# Patient Record
Sex: FEMALE | Race: WHITE | Employment: UNEMPLOYED | URBAN - METROPOLITAN AREA
[De-identification: names, ages, dates, MRNs, and addresses within clinical notes are randomized per-mention and may not be internally consistent; named-entity substitution may affect disease eponyms.]

---

## 2020-02-19 ENCOUNTER — THERAPY VISIT (OUTPATIENT)
Dept: PHYSICAL THERAPY | Facility: CLINIC | Age: 40
End: 2020-02-19
Payer: COMMERCIAL

## 2020-02-19 DIAGNOSIS — M25.572 LEFT ANKLE PAIN: ICD-10-CM

## 2020-02-19 DIAGNOSIS — M25.372 LEFT ANKLE INSTABILITY: ICD-10-CM

## 2020-02-19 PROCEDURE — 97112 NEUROMUSCULAR REEDUCATION: CPT | Mod: GP | Performed by: PHYSICAL THERAPIST

## 2020-02-19 PROCEDURE — 97110 THERAPEUTIC EXERCISES: CPT | Mod: GP | Performed by: PHYSICAL THERAPIST

## 2020-02-19 PROCEDURE — 97161 PT EVAL LOW COMPLEX 20 MIN: CPT | Mod: GP | Performed by: PHYSICAL THERAPIST

## 2020-02-19 NOTE — PROGRESS NOTES
[facility] Initial Evaluation  Subjective:  The history is provided by the patient.   Patient Entered Health History - See Therapist Evaluation below  Kate Jenkins being seen for L ankle sprain/weakness.     Problem began: 2020.   Problem occurred: fell down 2-3 stairs  and reported as 2/10 on pain scale.  General health as reported by patient is good.  Pertinent medical history includes: asthma and overweight.   Red flags:  None as reported by patient.     Surgeries include:  Orthopedic surgery. Other surgery history details: , wisdom teeth.    Current medications:  None.    Current occupation is None.                     Therapist Generated HPI Evaluation  Problem details: Has rolled or sprained her ankle many times over the many years. First one she can remember was about 15 years ago, and feels like her ankles have always been weak.     Planning on running [employer], which will be her first full marathon. Had not ran this year until yesterday. She did a walk/run interval run yesterday, to begin her marathon training. No pain during, but soreness after. .         Type of problem:  Left ankle.    This is a recurrent condition.  Condition occurred with:  A fall/slip.    Patient reports pain:  Lateral and medial.  Pain is described as aching and is constant.  Pain radiates to:  No radiation.   Since onset symptoms are unchanged.  Associated symptoms:  Edema and loss of strength. Symptoms are exacerbated by weight bearing, running, descending stairs, ascending stairs and walking  and relieved by rest (compression and elevation).      Barriers include:  None as reported by patient.    Physical Exam                    Objective:    Gait:  Pronation evident in L, mod degree. Shoewear is [employer] Guide, without insoles. Patient estimates 60 miles in this pair, changes this every 300-400 miles as per recommendation.              Ankle/Foot Evaluation  ROM:  Arom ankle eval:  decreased coordination apparent with in/eversion of L ankle.  AROM:    Dorsiflexion:  Left:   4  Right:   12  Plantarflexion:  Left:  42    Right:  50  Inversion:  Left:  46     Right:  42  Eversion:  10     Right:  15        Strength:    Dorsiflexion:  Left: 5-/5      Pain:+   Right: 5/5   Pain:  Plantarflexion: Left: 5/5    Pain:++   Right: 5/5  Pain:  Inversion:Left: 4/5   Pain:+       Eversion:Left: 4/5   Pain:++  Right: 5/5  Pain:              Strength wnl ankle: pain after 7 SL heel raises.  LIGAMENT TESTING:       Varus Stress (Calc Fib) Left: trace              PALPATION:   Left ankle tenderness present at:  deltoid ligament; anterior talofibular ligament; medial malleolus and lateral malleolus    EDEMA:   Left ankle edema present at: 52  Right ankle edema present at:  50      Figure 8 left: 52Figure 8 right: 50  MOBILITY TESTING:       Talocrural Left: hypomobile            FUNCTIONAL TESTS: Functional test ankle: squats with evident foot prontation.                                                                  General Evaluation:                      Balance:    Single Leg Stance--Eyes Open:  Left: 5/30 sec    Right: 20/30 sec                                                     ROS    Assessment/Plan:    Patient is a 40 year old female with left side ankle complaints.    Patient has the following significant findings with corresponding treatment plan.                Diagnosis 1:  L ankle sprain, recurrent - risk for MOE    Pain -  manual therapy and home program  Decreased ROM/flexibility - manual therapy and therapeutic exercise  Decreased joint mobility - manual therapy and therapeutic exercise  Decreased strength - therapeutic exercise and therapeutic activities  Impaired balance - neuro re-education and therapeutic activities  Decreased proprioception - neuro re-education and therapeutic activities  Decreased function - therapeutic activities    Therapy Evaluation Codes:     Cumulative Therapy  Evaluation is: Low complexity.    Previous and current functional limitations:  (See Goal Flow Sheet for this information)    Short term and Long term goals: (See Goal Flow Sheet for this information)     Communication ability:  Patient appears to be able to clearly communicate and understand verbal and written communication and follow directions correctly.  Treatment Explanation - The following has been discussed with the patient:   RX ordered/plan of care  Anticipated outcomes  Possible risks and side effects  This patient would benefit from PT intervention to resume normal activities.   Rehab potential is good.    Frequency:  1 X week, once daily  Duration:  for 6 weeks  Discharge Plan:  Achieve all LTG.  Independent in home treatment program.  Reach maximal therapeutic benefit.    Please refer to the daily flowsheet for treatment today, total treatment time and time spent performing 1:1 timed codes.

## 2020-02-26 ENCOUNTER — THERAPY VISIT (OUTPATIENT)
Dept: PHYSICAL THERAPY | Facility: CLINIC | Age: 40
End: 2020-02-26
Payer: COMMERCIAL

## 2020-02-26 DIAGNOSIS — M25.572 LEFT ANKLE PAIN: ICD-10-CM

## 2020-02-26 DIAGNOSIS — M25.372 LEFT ANKLE INSTABILITY: ICD-10-CM

## 2020-02-26 PROCEDURE — 97140 MANUAL THERAPY 1/> REGIONS: CPT | Mod: GP | Performed by: PHYSICAL THERAPIST

## 2020-02-26 PROCEDURE — 97110 THERAPEUTIC EXERCISES: CPT | Mod: GP | Performed by: PHYSICAL THERAPIST

## 2020-03-24 PROBLEM — M25.372 LEFT ANKLE INSTABILITY: Status: RESOLVED | Noted: 2020-02-19 | Resolved: 2020-03-24

## 2020-03-24 PROBLEM — M25.572 LEFT ANKLE PAIN: Status: RESOLVED | Noted: 2020-02-19 | Resolved: 2020-03-24

## 2020-03-24 NOTE — PROGRESS NOTES
DISCHARGE SUMMARY    Kate Jenkins was seen 2 times for evaluation and treatment.  Patient did not return for further treatment and current status is unknown.  Due to short treatment duration, no objective or functional changes were made.  Please see goal flow sheet from episode noted date below and initial evaluation for further information.  Patient is discharged from therapy and therapy episode is resolved as of 03/24/20.      Linked Episodes   Type: Episode: Status: Noted: Resolved: Last update: Updated by:   PHYSICAL THERAPY L ankle 2.19.2020 Active 2/19/2020 2/26/2020 12:28 PM Kareem Varela, PT      Comments:

## 2020-11-06 ENCOUNTER — HOSPITAL ENCOUNTER (OUTPATIENT)
Dept: BEHAVIORAL HEALTH | Facility: CLINIC | Age: 40
Discharge: HOME | End: 2020-11-06
Attending: FAMILY MEDICINE | Admitting: FAMILY MEDICINE
Payer: COMMERCIAL

## 2020-11-06 ENCOUNTER — BEH TREATMENT PLAN (OUTPATIENT)
Dept: BEHAVIORAL HEALTH | Facility: CLINIC | Age: 40
End: 2020-11-06
Attending: PSYCHIATRY & NEUROLOGY

## 2020-11-06 DIAGNOSIS — F33.1 MAJOR DEPRESSIVE DISORDER, RECURRENT EPISODE, MODERATE WITH ANXIOUS DISTRESS (H): ICD-10-CM

## 2020-11-06 PROCEDURE — 90791 PSYCH DIAGNOSTIC EVALUATION: CPT | Mod: GT | Performed by: SOCIAL WORKER
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
## 2020-11-09 ENCOUNTER — TELEPHONE (OUTPATIENT)
Dept: BEHAVIORAL HEALTH | Facility: CLINIC | Age: 40
End: 2020-11-09

## 2020-11-09 ASSESSMENT — ANXIETY QUESTIONNAIRES
5. BEING SO RESTLESS THAT IT IS HARD TO SIT STILL: NOT AT ALL
3. WORRYING TOO MUCH ABOUT DIFFERENT THINGS: SEVERAL DAYS
7. FEELING AFRAID AS IF SOMETHING AWFUL MIGHT HAPPEN: MORE THAN HALF THE DAYS
GAD7 TOTAL SCORE: 11
IF YOU CHECKED OFF ANY PROBLEMS ON THIS QUESTIONNAIRE, HOW DIFFICULT HAVE THESE PROBLEMS MADE IT FOR YOU TO DO YOUR WORK, TAKE CARE OF THINGS AT HOME, OR GET ALONG WITH OTHER PEOPLE: SOMEWHAT DIFFICULT
1. FEELING NERVOUS, ANXIOUS, OR ON EDGE: MORE THAN HALF THE DAYS
6. BECOMING EASILY ANNOYED OR IRRITABLE: NEARLY EVERY DAY
2. NOT BEING ABLE TO STOP OR CONTROL WORRYING: MORE THAN HALF THE DAYS

## 2020-11-09 ASSESSMENT — PATIENT HEALTH QUESTIONNAIRE - PHQ9
SUM OF ALL RESPONSES TO PHQ QUESTIONS 1-9: 8
5. POOR APPETITE OR OVEREATING: SEVERAL DAYS

## 2020-11-09 NOTE — TELEPHONE ENCOUNTER
Admission Date: 11/9/2020    Identify any current concerns with potential impact to admission:     medication/medical concerns: On Zoloft for 8 weeks at 50 mg.  NP  [facility] in [place], [place].  Want to look for a new provider.     immediate safety concerns:No    Does patient have safety plan? Yes.   Note: Please copy safety plan copied into [facility] Encounter     Other (insurance/childcare/transportation/housing/planned absences/etc): None    Patient's insurance is: [employer] [place] of [place] . Does patient need appointment with provider? Need to check    E-DEQUAN okay.                                                                                          Completed by: Hannah Kelly, MSW, LICSW

## 2020-11-10 ENCOUNTER — HOSPITAL ENCOUNTER (OUTPATIENT)
Dept: BEHAVIORAL HEALTH | Facility: CLINIC | Age: 40
End: 2020-11-10
Attending: PSYCHIATRY & NEUROLOGY
Payer: COMMERCIAL

## 2020-11-10 PROBLEM — F33.1 MAJOR DEPRESSIVE DISORDER, RECURRENT EPISODE, MODERATE WITH ANXIOUS DISTRESS (H): Status: ACTIVE | Noted: 2020-11-10

## 2020-11-10 PROCEDURE — 90853 GROUP PSYCHOTHERAPY: CPT | Mod: GT | Performed by: SOCIAL WORKER

## 2020-11-10 PROCEDURE — 90853 GROUP PSYCHOTHERAPY: CPT | Mod: 95 | Performed by: SOCIAL WORKER

## 2020-11-10 PROCEDURE — G0177 OPPS/PHP; TRAIN & EDUC SERV: HCPCS | Mod: GT

## 2020-11-10 ASSESSMENT — ANXIETY QUESTIONNAIRES: GAD7 TOTAL SCORE: 11

## 2020-11-10 NOTE — PROGRESS NOTES
Adult Day Treatment Program:  Individualized Treatment Plan     Date of Plan: 20    Name: Kate Jenikns MRN: [id number]    : 1980    Programs:  Adult Day Treatment (ADT)     Clinical Track (if applicable):  5A    DSM5 Diagnosis  296.32 (F33.1) Major Depressive Disorder, Recurrent Episode, Moderate _ and With anxious distress  300.02 (F41.1) Generalized Anxiety Disorder.    Adult Day Treatment Program Multidisciplinary Team Members: Keo Middleton MD and/or Dr. Emilia Valle, and/or Dr. Naveen Patel MD;     Kate Jenkins will participate in the Adult Day Treatment Program  3 days per week, 3 hours per day. Anticipated duration/discharge: 12 weeks    Due to COVID-19, services will be delivered via telemedicine until further notice.     Program Start Date: 11/10/20  Anticipated Discharge Date: 21 (pending authorization/clinical changes)    NOTE: Complete CGI     Review Date: Does Kate Jenkins continue to meet criteria to participate in the Adult Day Treatment Program, 3 days per week; 3 hours per day?   21                        Client Strengths:  caring, creative, educated, empathetic, has a previous history of therapy, intelligent, motivated, open to learning, open to suggestions / feedback, responsible parent, support of family, friends and providers, supportive and wants to learn     Client Participation in Plan:  Contributed to goals and plan     Areas of Vulnerability:  Anxiety  Depressive symptoms     Long-Term Goals:  Knowledge about illness and management of symptoms     Abuse Prevention Plan:  Safe, therapeutic environment   Safety coping plan as needed   Education regarding illness and skill development   Coordination with care providers     Discharge Criteria:  Satisfactory progress toward treatment goals   Improvement re: identified problems and symptoms   Ability to continue recovery at next level of service   Has a discharge plan in place   Has safety/coping plan in  place      Areas of Treatment Focus        Area of Treatment Focus:   Personal Safety  Start Date:    11/17/21    Goal:  Target Date: 1/12/21 Status: Subhash Love will notify staff when needing assistance to develop or implement a coping plan to manage suicidal or self injurious urges.Use coping plan for safety, as needed.      Progress:   12/22/20:   Client requested to discharge.   She denied suicidal ideation.        Treatment Strategies:   Assess / reassess level of potential for harm to self or others  Engage in safety planning when indicated  Facilitate increased self awareness          Area of Treatment Focus:   Symptom Stabilization and Management  Start Date:    11/17/20    Goal:  Target Date: 1/12/21 Status: Active  When in life skills Kate  will provide an update related to perceived progress with mental health recovery weekly.      Progress:   12/22/20:  Client made progress and met the goal.        Treatment Strategies:   Facilitate increased self awareness  Provide education regarding startegies and coping skills to assist with mental health recovery          Area of Treatment Focus:   Wellness and Mental Health  Start Date:    11/17/20    Goal:  Target Date: 1/12/21 Status: Subhash Love will improve wellness related behaviors by getting enough sleep,exercise, balanced nutrition and take medications (if prescribed) to maintain good mental health.        Progress:   12/22/20: Client worked on establishing a stable sleep routine.  She worked on balance as a parent.          Treatment Strategies:   Facilitate increased self awareness  Provide education regarding wellness habits and routines which can assist with mental health recovery          Area of Treatment Focus:   Community Resources / Support and Discharge Planning  Start Date:    11/17/20    Goal:  Target Date: 1/12/21 Status: Subhash Love will establish an aftercare plan to include medical providers and social supports by  "discharge.  Medications through Jo Uribe at [facility] in [place], [place].  Individual therapy with  Katie Richard [place], [place].  Also doing couples counseling.      Progress:   12/22/20:  Client is continuing with therapy and medication management.  She plans to attend a 6 week educational group on [facility] based on Crystal Dumont's work.          Treatment Strategies:   Facilitate increased self awareness  Provide education regarding relapse prevention,discharge planning and mental health support          Area of Treatment Focus:   Symptom Stabilization and Management  Start Date:    11/17/21    Goal:  Target Date: 1/12/21 Status: Completed  Kate will learn and practice tools for managing anxiety,  Kate will work on improving communication  with loved ones.      Progress:   12/22/20:  Kate consistently learned and practiced skills to manage anxiety.  She also practiced communication skills.        Treatment Strategies:   Teach adaptive coping skills and communication skills       Kameron Portillo, OTR/L      NOTE: Required signatures are completed manually and scanned into the electronic medical record. See \"Media\" tab in epic.    The Individualized Treatment Plan Signature Page has been routed to the provider for co-sign.      "

## 2020-11-10 NOTE — DISCHARGE SUMMARY
Adult Mental Health Intensive Outpatient Discharge Summary/Instructions      Patient: Kate Jenkins MRN: [id number]   : 1980 Age: 40 year old Sex: female     Admission Date: 11/10/20  Discharge Date: 20  Diagnosis: 296.32 (F33.1) Major Depressive Disorder, Recurrent Episode, Moderate _ and With anxious distress  300.02 (F41.1) Generalized Anxiety Disorder    Focus of Treatment / Progress    Personal Safety: Kate denied suicidal ideation at discharge.     * Follow your safety plan     * Call crisis lines as needed:    [facility] [phone] [facility] [phone]  [facility] [phone] Crisis Connection [phone]  [facility] [phone] [facility] [phone]  [facility] [phone] National Suicide Prevention [phone]  [facility] [phone] Suicide Prevention [phone]  [facility] [phone]    Managing symptoms of:  Kate worked on skills to manage depressive and anxiety symptoms.   She also worked on skills to challenge negative self talk.  She worked on skills for assertive communication.    Community support/[place] [address] ([phone]) [email or web address] [place] Crisis text line( Text [place] to 040663),  [facility]  [place], [place] ([phone])  [facility] [place], [place] ( [phone])  [facility] [address] [place] [address], [phone]      Managing Symptoms and Preventing Relapse    * Go to all of your appointments    * Take all medications as directed.      * Carry a current list if medication with you    * Do not use illicit (street) drugs.  Avoid alcohol    * Report these symptoms to your care team. These are early signs of relapse:   Thoughts of suicide   Losing more sleep   Increased confusion   Mood getting worse   Feeling more aggressive   Other:  Low motivation, anhedonia    *Use these skills daily:  Talk to someone you  "trust at least one time weekly, set boundaries and say \"no\", be assertive, act opposite of negative feelings, accept challenges with a positive attitude, exercise at least three times per week for 30 minutes,  get enough sleep, eat healthy foods, get into a good routine    Copy of summary sent to: In Epic    Follow up with psychiatrist / main caregiver: Jo Uribe NP, primary care provider    Next visit: as scheduled    Follow up with your therapist: Verenice Richard    Next visit: 1/5/21.  Usually weekly or every other week visits    Go to group therapy and / or support groups at: Consider [facility] and Depression Bipolar Support [place](DBSA) support groups    See your medical doctor about:  For an annual physical exam or any general wellness or illness as needed.    Other:  Kate plans on attending a 6 week group working through [facility] based on Crystal Marinelli's work.  Kate and her  also attend marriage therapy monthly.      Your treatment team appreciates having the opportunity to work with you and wishes you the best.    Client Signature:_______________________  Date / Time:___________  Staff Signature:________________________   Date / Time:___________      "

## 2020-11-10 NOTE — GROUP NOTE
Psychotherapy Group Note    PATIENT'S NAME: Kate Jenkins  MRN:   [id number]  :   1980  ACCT. NUMBER: [id number]  DATE OF SERVICE: 11/10/20  START TIME: 11:00 AM  END TIME: 11:50 AM  FACILITATOR: Hannah Kelly LICSW  TOPIC:  EBP Group: Emotions Management  Adult Mental Health Day Treatment  TRACK: 5A    NUMBER OF PARTICIPANTS: 6    Summary of Group / Topics Discussed:  Emotions Management: Guilt and Shame: Patients explored and shared personal experiences associated with feelings of guilt and shame.  Group explored how these feelings develop, what they mean to each individual, and how to increase acceptance and usefulness of these feelings.  Group members assisted one another to contextualize these concepts and promote healing.     Patient Session Goals / Objectives:    Discuss and review definitions and personal views/experiences with guilt and shame    Understand the differences between guilt and shame    Explore how feelings of guilt and shame impact functioning    Understand and practice strategies to manage difficult emotions and move towards healing    Understand and normalize difficult emotions through group discussion    Understand the utility of guilt and shame    Target  unwanted  emotions for change    Telemedicine Visit: The patient's condition can be safely assessed and treated via synchronous audio and visual telemedicine encounter.      Reason for Telemedicine Visit: Services only offered telehealth    Originating Site (Patient Location): Patient's home    Distant Site (Provider Location): Provider Remote Setting    Consent:  The patient/guardian has verbally consented to: the potential risks and benefits of telemedicine (video visit) versus in person care; bill my insurance or make self-payment for services provided; and responsibility for payment of non-covered services.     Mode of Communication:  Video Conference via Grokker    As the provider I attest to compliance with  applicable laws and regulations related to telemedicine.       Patient Participation / Response:  Fully participated with the group by sharing personal reflections / insights and openly received / provided feedback with other participants.    Demonstrated knowledge of when to consider applying a variety of emotions management skills in daily life    Treatment Plan:  Patient has an initial individualized treatment plan that was created as part of their diagnostic assessment / admission process.  A master individualized treatment plan is in the process of being developed with the patient and multi-disciplinary care team.    Hannah Kelly, [facility]

## 2020-11-10 NOTE — GROUP NOTE
Process Group Note    PATIENT'S NAME: Kate Jenkins  MRN:   [id number]  :   1980  ACCT. NUMBER: [id number]  DATE OF SERVICE: 11/10/20  START TIME:  9:00 AM  END TIME:  9:50 AM  FACILITATOR: Hannah Kelly LICSW  TOPIC: MH Process Group    Diagnoses:  296.32 (F33.1) Major Depressive Disorder, Recurrent Episode, Moderate _ and With anxious distress  300.02 (F41.1) Generalized Anxiety Disorder.      Adult Mental Health Day Treatment  TRACK: 5A    NUMBER OF PARTICIPANTS: 6    Telemedicine Visit: The patient's condition can be safely assessed and treated via synchronous audio and visual telemedicine encounter.      Reason for Telemedicine Visit: Services only offered telehealth    Originating Site (Patient Location): Patient's home    Distant Site (Provider Location): Provider Remote Setting    Consent:  The patient/guardian has verbally consented to: the potential risks and benefits of telemedicine (video visit) versus in person care; bill my insurance or make self-payment for services provided; and responsibility for payment of non-covered services.     Mode of Communication:  Video Conference via Dailymotion    As the provider I attest to compliance with applicable laws and regulations related to telemedicine.           Data:    Session content: At the start of this group, patients were invited to check in by identifying themselves, describing their current emotional status, and identifying issues to address in this group.   Area(s) of treatment focus addressed in this session included Symptom Management, Personal Safety and Community Resources/Discharge Planning.  Writer welcomed and oriented client to groups.  Writer reviewed confidentiality, limitations of confidentiality, and group guidelines.  Clari reported dealing with anxiety and depression for about 20 years.   She stated for past one year her symptoms have been high.  [place] in [place] and loved her job, family, and  [facility].   She stated that she got  then  again.   She stated that the couple decided to move to the [facility] to be close to her 's family and this has been difficult.   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 She stated that she would like to work on skills to understand her symptoms.   She also worked like to work on effective communication and assertiveness skills.  Client denied suicidal ideation, intent and plan.     Therapeutic Interventions/Treatment Strategies:  Psychotherapist offered support, feedback and validation and reinforced use of skills. Treatment modalities used include Cognitive Behavioral Therapy. Interventions include Coping Skills: Assisted patient in identifying 1-2 healthy distraction skills to reduce overall distress.    Assessment:    Patient response:   Patient responded to session by listening and focusing on goals    Possible barriers to participation / learning include: and no barriers identified    Health Issues:   None reported       Substance Use Review:   Substance Use: No active concerns identified.    Mental Status/Behavioral Observations  Appearance:   Appropriate   Eye Contact:   Good   Psychomotor Behavior: Normal   Attitude:   Cooperative   Orientation:   All  Speech   Rate / Production: Normal    Volume:  Normal   Mood:    Anxious  Depressed   Affect:    Appropriate   Thought Content:   Clear  Thought Form:  Coherent  Logical     Insight:    Good     Plan:     Safety Plan: No current safety concerns identified.  Recommended that patient call 911 or go to the local ED should there be a change in any of these risk factors.     Barriers to treatment: None identified    Patient Contracts (see media tab):  None    Substance Use: Not addressed in session     Continue or Discharge: Patient will continue in Adult  Day Treatment (ADT)  as planned. Patient is likely to benefit from learning and using skills as they work toward the goals identified in their treatment plan.      Hannha Kelly, [facility]  November 10, 2020

## 2020-11-10 NOTE — GROUP NOTE
Psychoeducation Group Note    PATIENT'S NAME: Kate Jenkins  MRN:   [id number]  :   1980  ACCT. NUMBER: [id number]  DATE OF SERVICE: 11/10/20  START TIME: 10:00 AM  END TIME: 10:50 AM  FACILITATOR: Kameron Portillo OTR/L  TOPIC: MH Life Skills Group: Resiliency Development  Telemedicine Visit: The patient's condition can be safely assessed and treated via synchronous audio and visual telemedicine encounter.      Reason for Telemedicine Visit: COVID-19    Originating Site (Patient Location): Patient's home    Distant Site (Provider Location): [facility]: [facility]    Consent:  The patient/guardian has verbally consented to: the potential risks and benefits of telemedicine (video visit) versus in person care; bill my insurance or make self-payment for services provided; and responsibility for payment of non-covered services.     Mode of Communication:  Video Conference via Wayin    As the provider I attest to compliance with applicable laws and regulations related to telemedicine.   Adult Mental Health Day Treatment  TRACK: 5A    NUMBER OF PARTICIPANTS: 6    Summary of Group / Topics Discussed:  Resiliency Development:  Self Esteem and Self Compassion: A Letter to Myself: Patients were given time for reflection and built self awareness around components of self compassion and how it relates to self esteem and overall functioning.  Patients were also given the opportunity to improve self efficacy, self sufficiency, quality of life and a sense of mastery and competency by identifying what is good and to instill hope. Patients were taught about the importance of self kindness and ways to challenge negative thinking.      Patient Session Goals / Objectives:    Identified personal strengths and qualities about themselves as a way to provide hope and build self-compassion as a way to effectively manage both mental health and substance abuse/abuse symptoms     Improved awareness of  positive qualities and how these contribute to the process of recovery        Established a plan for practice of these skills in their own environments    Practiced and reflected on how to generalize taught skills to their everyday life        Patient Participation / Response:  Fully participated with the group by sharing personal reflections / insights and openly received / provided feedback with other participants.    Demonstrated understanding of content through handout and group discussion,  and Verbalized understanding of content    Treatment Plan:  Patient has a current master individualized treatment plan.  See Epic treatment plan for more information.    Kameron Portillo, OTR/L

## 2020-11-12 ENCOUNTER — HOSPITAL ENCOUNTER (OUTPATIENT)
Dept: BEHAVIORAL HEALTH | Facility: CLINIC | Age: 40
End: 2020-11-12
Attending: PSYCHIATRY & NEUROLOGY
Payer: COMMERCIAL

## 2020-11-12 PROCEDURE — 90853 GROUP PSYCHOTHERAPY: CPT | Mod: 95 | Performed by: SOCIAL WORKER

## 2020-11-12 PROCEDURE — 90853 GROUP PSYCHOTHERAPY: CPT | Mod: GT | Performed by: SOCIAL WORKER

## 2020-11-12 NOTE — GROUP NOTE
Psychotherapy Group Note    PATIENT'S NAME: Kate Jenkins  MRN:   [id number]  :   1980  ACCT. NUMBER: [id number]  DATE OF SERVICE: 20  START TIME: 10:00 AM  END TIME: 10:50 AM  FACILITATOR: Hannah Kelly LICSW  TOPIC: MH EBP Group: Specialty Awareness  Adult Mental Health Day Treatment  TRACK: 5A    NUMBER OF PARTICIPANTS: 7    Summary of Group / Topics Discussed:  Specialty Topics: Life Transitions: The topic of life transitions was presented in order to help patients to better understand the challenges presented by life transitions, and how to best navigate them. Exploring the phases of transition and how one works through them was discussed. Patients were provided with information regarding community resources.     Patient Session Goals / Objectives:    Discussed the timing and nature of major life transitions    Explored how life transitions may impact mental health and functioning    Discussed coping strategies to manage symptoms and help with transitioning    Discussed and planned a successful transition  Telemedicine Visit: The patient's condition can be safely assessed and treated via synchronous audio and visual telemedicine encounter.      Reason for Telemedicine Visit: Services only offered telehealth    Originating Site (Patient Location): Patient's home    Distant Site (Provider Location): Provider Remote Setting    Consent:  The patient/guardian has verbally consented to: the potential risks and benefits of telemedicine (video visit) versus in person care; bill my insurance or make self-payment for services provided; and responsibility for payment of non-covered services.     Mode of Communication:  Video Conference via Chictini    As the provider I attest to compliance with applicable laws and regulations related to telemedicine.       Patient Participation / Response:  Fully participated with the group by sharing personal reflections / insights and openly received /  provided feedback with other participants.    Identified / Expressed readiness to act on skill suggestions discussed in topic    Treatment Plan:  Patient has a current master individualized treatment plan.  See Epic treatment plan for more information.    Hannah Kelly, SIDRASW

## 2020-11-12 NOTE — GROUP NOTE
"Process Group Note    PATIENT'S NAME: Kate Jenkins  MRN:   [id number]  :   1980  ACCT. NUMBER: [id number]  DATE OF SERVICE: 20  START TIME:  9:00 AM  END TIME:  9:50 AM  FACILITATOR: Hannah Kelly [facility]  TOPIC: MH Process Group    Diagnoses:  296.32 (F33.1) Major Depressive Disorder, Recurrent Episode, Moderate _ and With anxious distress  300.02 (F41.1) Generalized Anxiety Disorder.      Adult Mental Health Day Treatment  TRACK: 5A    NUMBER OF PARTICIPANTS: 7    Telemedicine Visit: The patient's condition can be safely assessed and treated via synchronous audio and visual telemedicine encounter.      Reason for Telemedicine Visit: Services only offered telehealth    Originating Site (Patient Location): Patient's home    Distant Site (Provider Location): Provider Remote Setting    Consent:  The patient/guardian has verbally consented to: the potential risks and benefits of telemedicine (video visit) versus in person care; bill my insurance or make self-payment for services provided; and responsibility for payment of non-covered services.     Mode of Communication:  Video Conference via MusicXray    As the provider I attest to compliance with applicable laws and regulations related to telemedicine.           Data:    Session content: At the start of this group, patients were invited to check in by identifying themselves, describing their current emotional status, and identifying issues to address in this group.   Area(s) of treatment focus addressed in this session included Symptom Management, Personal Safety and Community Resources/Discharge Planning.  Kate reported that she was really tired after her first day on Tuesday.   She stated that she felt more productive and energetic on Wednesday.  She stated that she wanted feedback for managing the self-talk \"I am not good enough\" and for ways to handle it when her  offers criticism.  Client denied suicidal ideation, intent and " plan.     Therapeutic Interventions/Treatment Strategies:  Psychotherapist offered support, feedback and validation and reinforced use of skills. Treatment modalities used include Cognitive Behavioral Therapy. Interventions include Coping Skills: Assisted patient in identifying 1-2 healthy distraction skills to reduce overall distress.    Assessment:    Patient response:   Patient responded to session by giving feedback, listening and being attentive    Possible barriers to participation / learning include: and no barriers identified    Health Issues:   None reported       Substance Use Review:   Substance Use: No active concerns identified.    Mental Status/Behavioral Observations  Appearance:   Appropriate   Eye Contact:   Good   Psychomotor Behavior: Normal   Attitude:   Cooperative   Orientation:   All  Speech   Rate / Production: Normal    Volume:  Normal   Mood:    Anxious  Depressed   Affect:    Appropriate   Thought Content:   Clear  Thought Form:  Coherent  Logical     Insight:    Good     Plan:     Safety Plan: No current safety concerns identified.  Recommended that patient call 911 or go to the local ED should there be a change in any of these risk factors.     Barriers to treatment: None identified    Patient Contracts (see media tab):  None    Substance Use: Not addressed in session     Continue or Discharge: Patient will continue in Adult Day Treatment (ADT)  as planned. Patient is likely to benefit from learning and using skills as they work toward the goals identified in their treatment plan.      Hannah Kelly, [facility]  November 12, 2020

## 2020-11-12 NOTE — GROUP NOTE
Psychotherapy Group Note    PATIENT'S NAME: Kate Jenkins  MRN:   [id number]  :   1980  ACCT. NUMBER: [id number]  DATE OF SERVICE: 20  START TIME: 11:00 AM  END TIME: 11:50 AM  FACILITATOR: Hannah Dodge LICSW  TOPIC:  EBP Group: Enhanced Mindfulness  Adult Mental Health Day Treatment  TRACK: 5A    NUMBER OF PARTICIPANTS: 6    Summary of Group / Topics Discussed:  Enhanced Mindfulness: Body and Mind Integration: Patients received an overview and education regarding the importance of including the body in the management of emotional health and self-care and as a direct route to mindfulness practice.  Patients discussed various ways in which the body can serve as an informant to their physical and emotional experiences/need. Patients discussed the body as a direct link to the present moment and to mindfulness practice.  Patients discussed current relationship with body, self-awareness, mindfulness practice and barriers to connection with body.  Patients were guided through breath work and movement to facilitate greater self-awareness, grounding, self-expression, and connection to other.  Patients discussed how the experiential could be applied to better manage mental health and develop noyola connection to self.    Patient Session Goals / Objectives:    Identify how movement awareness could be used for grounding, stress management, self-expression, connection to other and self-regulation    Improved awareness of breath and movement preferences    Identify how movement and the body is used in mindfulness practice    Reflect on use of these practices in everyday life.    Identify barriers to attending to body    Telemedicine Visit: The patient's condition can be safely assessed and treated via synchronous audio and visual telemedicine encounter.          Reason for Telemedicine Visit: Services only offered telehealth and [id number]        Originating Site (Patient Location): Patient's home        Distant  Site (Provider Location): Provider Remote Setting        Consent:  The patient/guardian has verbally consented to: the potential risks and benefits of telemedicine (video visit) versus in person care; bill my insurance or make self-payment for services provided; and responsibility for payment of non-covered services.         Mode of Communication:  Video Conference via [email or web address]        As the provider I attest to compliance with applicable laws and regulations related to telemedicine.         Patient Participation / Response:  Fully participated with the group by sharing personal reflections / insights and openly received / provided feedback with other participants.    Demonstrated understanding of topics discussed through group discussion and participation, Identified / Expressed readiness to act on skill suggestions discussed in topic and Practiced skills in session    Treatment Plan:  Patient has a current master individualized treatment plan.  See Epic treatment plan for more information.    SIDRA PaulaSW

## 2020-11-13 ENCOUNTER — TELEPHONE (OUTPATIENT)
Dept: BEHAVIORAL HEALTH | Facility: CLINIC | Age: 40
End: 2020-11-13

## 2020-11-13 NOTE — TELEPHONE ENCOUNTER
"RN Review of Medical History / Physical Health Screen  Outpatient Mental Health Programs - Adult    Adult Mental Health Day Treatment    PATIENT'S NAME: Kate Jenkins  MRN:   [id number]  :   1980  ACCT. NUMBER: [id number]  CURRENT AGE:  40 year old    DATE OF DIAGNOSTIC ASSESSMENT: 20  DATE OF ADMISSION: 11/10/20     Please see Diagnostic Assessment for additional Medical History.     General Health:   Have you had any exposure to any communicable disease in the past 2-3 weeks? no     Are you aware of safe sex practices? yes       Nutrition:    Are you on a special diet? If yes, please explain:  no   Do you have any concerns regarding your nutritional status? If yes, please explain:  no   Have you had any appetite changes in the last 3 months?  No     Have you had any weight loss or weight gain in the last 3 months?  Yes, how much? Lost 5-10 pounds; trying to do so     Do you have a history of an eating disorder? no   Do you have a history of being in an eating disorder program? no     Patient height and weight recorded by RN in epic flowsheet: no No; Unable to measure  Because of temporary in-person programmatic suspension due to COVID-19 pandemic, all pt weights and heights will be collected through patient self-report an recorded in physical health screening progress note upon admission to the program.                            Height/Weight Review:  Patient reported height:     5'3\"   Patient reports weight:  Date last checked:  175 pounds; today   Any referrals/needs identified?  none identified              BMI Review:  Was the patient informed of BMI? no see above      Findings See above       Fall Risk:   Have you had any falls in the past 3 months? yes fell about 4 months ago - twisted ankle while running     Do you currently use any assistive devices for mobility?   no      Additional Comments/Assessment: Reports occasional dizziness when standing up quickly, has been happening occassionally " for many years. Denies seizures.     Per completion of the Medical History / Physical Health Screen, is there a recommendation to see / follow up with a primary care physician/clinic or dentist?    No.      Miriam Ricks RN  11/13/2020    \

## 2020-11-16 ENCOUNTER — HOSPITAL ENCOUNTER (OUTPATIENT)
Dept: BEHAVIORAL HEALTH | Facility: CLINIC | Age: 40
End: 2020-11-16
Attending: PSYCHIATRY & NEUROLOGY
Payer: COMMERCIAL

## 2020-11-16 PROCEDURE — 90853 GROUP PSYCHOTHERAPY: CPT | Mod: 95 | Performed by: SOCIAL WORKER

## 2020-11-16 PROCEDURE — G0177 OPPS/PHP; TRAIN & EDUC SERV: HCPCS | Mod: GT

## 2020-11-16 NOTE — GROUP NOTE
Psychotherapy Group Note    PATIENT'S NAME: Kate Jenkins  MRN:   [id number]  :   1980  ACCT. NUMBER: [id number]  DATE OF SERVICE: 20  START TIME: 10:00 AM  END TIME: 10:50 AM  FACILITATOR: Hannah Kelly LICSW  TOPIC: MH EBP Group: Coping Skills  [facility] Mental Health Day Treatment  TRACK: 5A    NUMBER OF PARTICIPANTS: 5    Summary of Group / Topics Discussed:  Coping Skills: Radical Acceptance: Patients learned radical acceptance as a way to tolerate heightened stress in the moment.  Patients identified situations that necessitate radical acceptance.  They focused on applying acceptance of the moment to tolerate difficult emotions and events. Patients discussed how to distinguish when this can be useful in their lives and when other skills are more relevant or helpful.    Patient Session Goals / Objectives:    Understand that some amount of pain exists for each of us in our lives    Process the difficulty of acceptance in our lives and benefits of radical acceptance to mood and functioning.    Demonstrate understanding of when to use the radical acceptance to tolerate distress by providing examples of situations where this could be applied.    Identify barriers to applying radical acceptance to difficult situations and explore strategies to overcome them    Telemedicine Visit: The patient's condition can be safely assessed and treated via synchronous audio and visual telemedicine encounter.      Reason for Telemedicine Visit: Services only offered telehealth    Originating Site (Patient Location): Patient's home    Distant Site (Provider Location): Provider Remote Setting    Consent:  The patient/guardian has verbally consented to: the potential risks and benefits of telemedicine (video visit) versus in person care; bill my insurance or make self-payment for services provided; and responsibility for payment of non-covered services.     Mode of Communication:  Video Conference  via [employer]    As the provider I attest to compliance with applicable laws and regulations related to telemedicine.         Patient Participation / Response:  Fully participated with the group by sharing personal reflections / insights and openly received / provided feedback with other participants.    Identified 2-3 positive coping strategies that have helped maintain / improve symptoms in the past    Treatment Plan:  Patient has a current master individualized treatment plan.  See Epic treatment plan for more information.    Hannah Kelly, SIDRASW

## 2020-11-16 NOTE — GROUP NOTE
Process Group Note    PATIENT'S NAME: Kate Jenkins  MRN:   [id number]  :   1980  ACCT. NUMBER: [id number]  DATE OF SERVICE: 20  START TIME:  9:00 AM  END TIME:  9:50 AM  FACILITATOR: Hannah Kelly LICSW  TOPIC: MH Process Group    Diagnoses:  296.32 (F33.1) Major Depressive Disorder, Recurrent Episode, Moderate _ and With anxious distress  300.02 (F41.1) Generalized Anxiety Disorder.      [facility] Mental Health Day Treatment  TRACK: 5A    NUMBER OF PARTICIPANTS: 5    Telemedicine Visit: The patient's condition can be safely assessed and treated via synchronous audio and visual telemedicine encounter.      Reason for Telemedicine Visit: Services only offered telehealth    Originating Site (Patient Location): Patient's home    Distant Site (Provider Location): Provider Remote Setting    Consent:  The patient/guardian has verbally consented to: the potential risks and benefits of telemedicine (video visit) versus in person care; bill my insurance or make self-payment for services provided; and responsibility for payment of non-covered services.     Mode of Communication:  Video Conference via [facility]    As the provider I attest to compliance with applicable laws and regulations related to telemedicine.           Data:    Session content: At the start of this group, patients were invited to check in by identifying themselves, describing their current emotional status, and identifying issues to address in this group.   Area(s) of treatment focus addressed in this session included Symptom Management, Personal Safety and Community Resources/Discharge Planning.  Kate reported feeling overwhelmed today.   She shared that she has Day Treatment, individual therapy, and a medication management appointment all today.  She reported feeling anxious about the medication management appointment as she has been having restless legs.   She is concerned what will happen if she increases the  sertraline and if she will need to discontinue the medication.  Peers gave feedback on ways to share concerns with the provider.  Client denied suicidal ideation, intent and plan.     Therapeutic Interventions/Treatment Strategies:  Psychotherapist offered support, feedback and validation and reinforced use of skills. Treatment modalities used include Cognitive Behavioral Therapy. Interventions include Relationship Skills: Assisted patients in implementing more effective communication skills in their relationships.    Assessment:    Patient response:   Patient responded to session by listening, focusing on goals and being attentive    Possible barriers to participation / learning include: and no barriers identified    Health Issues:   None reported       Substance Use Review:   Substance Use: No active concerns identified.    Mental Status/Behavioral Observations  Appearance:   Appropriate   Eye Contact:   Good   Psychomotor Behavior: Normal   Attitude:   Cooperative   Orientation:   All  Speech   Rate / Production: Normal    Volume:  Normal   Mood:    Anxious  Depressed   Affect:    Appropriate   Thought Content:   Clear  Thought Form:  Coherent  Logical     Insight:    Good     Plan:     Safety Plan: No current safety concerns identified.  Recommended that patient call 911 or go to the local ED should there be a change in any of these risk factors.     Barriers to treatment: None identified    Patient Contracts (see media tab):  None    Substance Use: Not addressed in session     Continue or Discharge: Patient will continue in Adult Day Treatment (ADT)  as planned. Patient is likely to benefit from learning and using skills as they work toward the goals identified in their treatment plan.      Hannah Kelly, [facility]  November 16, 2020

## 2020-11-16 NOTE — GROUP NOTE
Psychoeducation Group Note    PATIENT'S NAME: Kate Jenkins  MRN:   [id number]  :   1980  ACCT. NUMBER: [id number]  DATE OF SERVICE: 20  START TIME: 11:00 AM  END TIME: 11:50 AM  FACILITATOR: Miriam Ricks RN  TOPIC:  RN Group: [facility]  Telemedicine Visit: The patient's condition can be safely assessed and treated via synchronous audio and visual telemedicine encounter.      Reason for Telemedicine Visit:  covid19    Originating Site (Patient Location): Patient's home    Distant Site (Provider Location): Provider Remote Setting    Consent:  The patient/guardian has verbally consented to: the potential risks and benefits of telemedicine (video visit) versus in person care; bill my insurance or make self-payment for services provided; and responsibility for payment of non-covered services.     Mode of Communication:  Video Conference via [employer]    As the provider I attest to compliance with applicable laws and regulations related to telemedicine.      [facility] Mental Health Day Treatment  TRACK: 5A    NUMBER OF PARTICIPANTS: 5    Summary of Group / Topics Discussed:  Foundations of Health: Nutrition: Functional nutrition: Patients were provided education on the role of nutrition in the body and all of the functions that nutrition influences including energy, body structure and bodily function as overarching categories. Select macronutrients and micronutrients and their important roles and functions were also reviewed.    Patient Session Goals / Objectives:    ? Described the role of macronutrients and micronutrients in the body  ? Explained the effects of  nutrition on mental health  ? Listed strategies for promoting balanced nutrition to promote wellness        Patient Participation / Response:  Fully participated with the group by sharing personal reflections / insights and openly received / provided feedback with other participants.    Demonstrated understanding  of topics discussed through group discussion and participation and Identified / Expressed personal readiness to practice skills    Treatment Plan:  Patient has an initial individualized treatment plan that was created as part of their diagnostic assessment / admission process.  A master individualized treatment plan is in the process of being developed with the patient and multi-disciplinary care team.    Miriam Ricks RN

## 2020-11-17 ENCOUNTER — HOSPITAL ENCOUNTER (OUTPATIENT)
Dept: BEHAVIORAL HEALTH | Facility: CLINIC | Age: 40
End: 2020-11-17
Attending: PSYCHIATRY & NEUROLOGY
Payer: COMMERCIAL

## 2020-11-17 PROCEDURE — 90853 GROUP PSYCHOTHERAPY: CPT | Mod: 95 | Performed by: SOCIAL WORKER

## 2020-11-17 PROCEDURE — G0177 OPPS/PHP; TRAIN & EDUC SERV: HCPCS | Mod: GT

## 2020-11-17 NOTE — PROGRESS NOTES
.  Acknowledgement of Current Treatment Plan       I have reviewed my treatment plan with my therapist  on 11/17/20.   I agree with the plan as it is written in the electronic health record. (5A)    Name:      Signature:  Kate Jenkins Unable to sign due to COVID 19.     Dr Keo Middleton MD  Psychiatrist    Hannah Kelly, [facility]  Psychotherapist ANTOINE Khanna, [facility]

## 2020-11-17 NOTE — GROUP NOTE
Psychotherapy Group Note    PATIENT'S NAME: Kate Jenkins  MRN:   [id number]  :   1980  ACCT. NUMBER: [id number]  DATE OF SERVICE: 20  START TIME: 11:00 AM  END TIME: 11:50 AM  FACILITATOR: Hannah Kelly LICSW  TOPIC: MH EBP Group: Coping Skills  [facility] Adult Mental Health Day Treatment  TRACK: 5A    NUMBER OF PARTICIPANTS: 5    Summary of Group / Topics Discussed:  Coping Skills: Improve the Moment: Patients learned to tolerate distress, by applying strategies to effect positive change in the present moment.  Patients will identified situations where they would benefit from applying strategies to improve the moment and reduce distress. Patients discussed how to distinguish when this can be useful in their lives or when other strategies would be more relevant or helpful.    Patient Session Goals / Objectives:    Discuss how the use of intentional  in the moment  actions can help reduce distress.    Review patients current practices and discuss a more formal way of practicing and accessing skills.    Increase ability to decide when to use improve the moment strategies    Choose 1-2 in the moment actions to apply during times of distress.  Telemedicine Visit: The patient's condition can be safely assessed and treated via synchronous audio and visual telemedicine encounter.      Reason for Telemedicine Visit: Services only offered telehealth    Originating Site (Patient Location): Patient's home    Distant Site (Provider Location): Provider Remote Setting    Consent:  The patient/guardian has verbally consented to: the potential risks and benefits of telemedicine (video visit) versus in person care; bill my insurance or make self-payment for services provided; and responsibility for payment of non-covered services.     Mode of Communication:  Video Conference via [employer]    As the provider I attest to compliance with applicable laws and regulations related to telemedicine.        Patient Participation / Response:  Fully participated with the group by sharing personal reflections / insights and openly received / provided feedback with other participants.    Identified / Expressed personal readiness to practice new coping skills    Treatment Plan:  Patient has a current master individualized treatment plan.  See Epic treatment plan for more information.    Hannah Kelly, [facility]

## 2020-11-17 NOTE — GROUP NOTE
Process Group Note    PATIENT'S NAME: Kate Jenkins  MRN:   [id number]  :   1980  ACCT. NUMBER: [id number]  DATE OF SERVICE: 20  START TIME:  9:00 AM  END TIME:  9:50 AM  FACILITATOR: Hannah Kelly LICSW  TOPIC: MH Process Group    Diagnoses:  296.32 (F33.1) Major Depressive Disorder, Recurrent Episode, Moderate _ and With anxious distress  300.02 (F41.1) Generalized Anxiety Disorder.      [facility] Mental Health Day Treatment  TRACK: 5A    NUMBER OF PARTICIPANTS: 6    Telemedicine Visit: The patient's condition can be safely assessed and treated via synchronous audio and visual telemedicine encounter.      Reason for Telemedicine Visit: Services only offered telehealth    Originating Site (Patient Location): Patient's home    Distant Site (Provider Location): Provider Remote Setting    Consent:  The patient/guardian has verbally consented to: the potential risks and benefits of telemedicine (video visit) versus in person care; bill my insurance or make self-payment for services provided; and responsibility for payment of non-covered services.     Mode of Communication:  Video Conference via [employer]    As the provider I attest to compliance with applicable laws and regulations related to telemedicine.           Data:    Session content: At the start of this group, patients were invited to check in by identifying themselves, describing their current emotional status, and identifying issues to address in this group.   Area(s) of treatment focus addressed in this session included Symptom Management, Personal Safety and Community Resources/Discharge Planning.  Kate reported pretty good mood today as the anxiety over the three appointments has passed.  She stated that she felt very overwhelmed and anxious yesterday.  She stated that she received positive feedback form the therapsit yesterday and also made a decision with her medication provider to keep the medications the same  until the next appointment to monitor for th restless legs.  Client denied suicidal ideation, intent and plan. She reported feeling more hopeful today.    Therapeutic Interventions/Treatment Strategies:  Psychotherapist offered support, feedback and validation and reinforced use of skills. Treatment modalities used include Cognitive Behavioral Therapy. Interventions include Coping Skills: Addressed barriers to utilizing coping skills when in distress.    Assessment:    Patient response:   Patient responded to session by listening, focusing on goals and being attentive    Possible barriers to participation / learning include: and no barriers identified    Health Issues:   None reported       Substance Use Review:   Substance Use: No active concerns identified.    Mental Status/Behavioral Observations  Appearance:   Appropriate   Eye Contact:   Good   Psychomotor Behavior: Normal   Attitude:   Cooperative  Pleasant  Orientation:   All  Speech   Rate / Production: Normal    Volume:  Normal   Mood:    Anxious  Depressed   Affect:    Appropriate   Thought Content:   Clear  Thought Form:  Coherent  Logical     Insight:    Good     Plan:     Safety Plan: No current safety concerns identified.  Recommended that patient call 911 or go to the local ED should there be a change in any of these risk factors.     Barriers to treatment: None identified    Patient Contracts (see media tab):  None    Substance Use: Not addressed in session     Continue or Discharge: Patient will continue in Adult Day Treatment (ADT)  as planned. Patient is likely to benefit from learning and using skills as they work toward the goals identified in their treatment plan.      Hannah Kelly, [facility]  November 17, 2020

## 2020-11-17 NOTE — GROUP NOTE
Psychoeducation Group Note    PATIENT'S NAME: Kate Jenkins  MRN:   [id number]  :   1980  ACCT. NUMBER: [id number]  DATE OF SERVICE: 20  START TIME: 10:00 AM  END TIME: 10:50 AM  FACILITATOR: Kameron Portillo OTR/L  TOPIC: MH Life Skills Group: Resiliency Development  Telemedicine Visit: The patient's condition can be safely assessed and treated via synchronous audio and visual telemedicine encounter.      Reason for Telemedicine Visit: COVID-19    Originating Site (Patient Location): Patient's home    Distant Site (Provider Location): [facility]: [facility]    Consent:  The patient/guardian has verbally consented to: the potential risks and benefits of telemedicine (video visit) versus in person care; bill my insurance or make self-payment for services provided; and responsibility for payment of non-covered services.     Mode of Communication:  Video Conference via [employer]    As the provider I attest to compliance with applicable laws and regulations related to telemedicine.   [facility] Adult Mental Health Day Treatment  TRACK: 5A    NUMBER OF PARTICIPANTS: 5    Summary of Group / Topics Discussed:  Resiliency Development:  Self Awareness (Emotional Awareness and Self-Knowledge): Patients explored and identified their strengths, emotions, barriers, skills, and behavior patterns that occur when changes happen in life.  Focus was on recognizing these aspects and developing ways to help support moving forward, making changes as needed to support resiliency.      Patient Session Goals / Objectives:    Identified emotions, barriers, skills, strengths, and behavior patterns that occur when changes happen in life and how to effectively cope     Improved awareness of the process of change and skills and strategies that support this       Established a plan for practice of these skills in their own environments    Practiced and reflected on how to generalize taught skills to  their everyday life        Patient Participation / Response:  Fully participated with the group by sharing personal reflections / insights and openly received / provided feedback with other participants.    Demonstrated understanding of content through handout and group discussion  and Verbalized understanding of content    Treatment Plan:  Patient has a current master individualized treatment plan.  See Epic treatment plan for more information.    Kameron Portillo, OTR/L

## 2020-11-19 ENCOUNTER — HOSPITAL ENCOUNTER (OUTPATIENT)
Dept: BEHAVIORAL HEALTH | Facility: CLINIC | Age: 40
End: 2020-11-19
Attending: PSYCHIATRY & NEUROLOGY
Payer: COMMERCIAL

## 2020-11-19 PROCEDURE — 90853 GROUP PSYCHOTHERAPY: CPT | Mod: GT | Performed by: SOCIAL WORKER

## 2020-11-19 NOTE — GROUP NOTE
Process Group Note    PATIENT'S NAME: Kate Jenkins  MRN:   [id number]  :   1980  ACCT. NUMBER: [id number]  DATE OF SERVICE: 20  START TIME:  9:00 AM  END TIME:  9:50 AM  FACILITATOR: Hannah Kelly [facility]  TOPIC: MH Process Group    Diagnoses:  296.32 (F33.1) Major Depressive Disorder, Recurrent Episode, Moderate _ and With anxious distress  300.02 (F41.1) Generalized Anxiety Disorder.      [facility] Mental Health Day Treatment  TRACK: 5A    NUMBER OF PARTICIPANTS: 6    Telemedicine Visit: The patient's condition can be safely assessed and treated via synchronous audio and visual telemedicine encounter.      Reason for Telemedicine Visit: Services only offered telehealth    Originating Site (Patient Location): Patient's home    Distant Site (Provider Location): Provider Remote Setting    Consent:  The patient/guardian has verbally consented to: the potential risks and benefits of telemedicine (video visit) versus in person care; bill my insurance or make self-payment for services provided; and responsibility for payment of non-covered services.     Mode of Communication:  Video Conference via [employer]    As the provider I attest to compliance with applicable laws and regulations related to telemedicine.           Data:    Session content: At the start of this group, patients were invited to check in by identifying themselves, describing their current emotional status, and identifying issues to address in this group.   Area(s) of treatment focus addressed in this session included Symptom Management, Personal Safety and Community Resources/Discharge Planning.  Kate reported that she had some decrease in anxiety level.   She is looking forward to spending the weekend with her teenage daughter, whom she shares custody with her ex-partner.  She shared that she feels upset when her new  criticizes something she does or raises concerns when she is out of town visiting her  daughter.   She stated that he also criticizes the ear and tear on the car as she visits her daughter in [place].  Peers shared communication strategies to manage the conflict over mileage on the car and handling the other complaints.  Client denied suicidal ideation, intent and plan.     Therapeutic Interventions/Treatment Strategies:  Psychotherapist offered support, feedback and validation and reinforced use of skills. Treatment modalities used include Cognitive Behavioral Therapy. Interventions include Relationship Skills: Assisted patients in implementing more effective communication skills in their relationships, Encouraged development and maintenance  of healthy boundaries and Discussed relationships and ways to reduce conflict .    Assessment:    Patient response:   Patient responded to session by listening and focusing on goals    Possible barriers to participation / learning include: and no barriers identified    Health Issues:   None reported       Substance Use Review:   Substance Use: No active concerns identified.    Mental Status/Behavioral Observations  Appearance:   Appropriate   Eye Contact:   Good   Psychomotor Behavior: Normal   Attitude:   Cooperative   Orientation:   All  Speech   Rate / Production: Normal    Volume:  Normal   Mood:    Anxious  Depressed   Affect:    Appropriate   Thought Content:   Clear  Thought Form:  Coherent  Logical     Insight:    Good     Plan:     Safety Plan: No current safety concerns identified.  Recommended that patient call 911 or go to the local ED should there be a change in any of these risk factors.     Barriers to treatment: None identified    Patient Contracts (see media tab):  None    Substance Use: Not addressed in session     Continue or Discharge: Patient will continue in Adult Day Treatment (ADT)  as planned. Patient is likely to benefit from learning and using skills as they work toward the goals identified in their treatment plan.      Hannah Anaya  Robin, [facility]  November 19, 2020

## 2020-11-19 NOTE — GROUP NOTE
Psychotherapy Group Note    PATIENT'S NAME: Kate Jenkins  MRN:   [id number]  :   1980  ACCT. NUMBER: [id number]  DATE OF SERVICE: 20  START TIME: 10:00 AM  END TIME: 10:50 AM  FACILITATOR: Hannah Kelly LICSW  TOPIC: MH EBP Group: Coping Skills  [facility] Adult Mental Health Day Treatment  TRACK: 5A    NUMBER OF PARTICIPANTS: 7    Summary of Group / Topics Discussed:  Coping Skills: Building Positive Experiences: Patients discussed the importance of planning and engaging in positive experiences, as strategies to increase positive thinking, hope, and self-worth.  Explored the benefits of planning / creating positive experiences, including recognizing and reducing negativity bias by focusing on and building positive experiences.   Several approaches to building positive experiences were presented and discussed relevant to each patient.      Patient Session Goals / Objectives:    Understand the purpose of planning / creating / participating / sharing in positive experiences.    Explore patient s experiences related to negative thinking and how it influences activities and moodIdentify current positive events in patient s life.     Set goals to increase a variety of positive experiences.    Address barriers to planning / engaging in positive experiences.    Telemedicine Visit: The patient's condition can be safely assessed and treated via synchronous audio and visual telemedicine encounter.      Reason for Telemedicine Visit: Services only offered telehealth    Originating Site (Patient Location): Patient's home    Distant Site (Provider Location): Provider Remote Setting    Consent:  The patient/guardian has verbally consented to: the potential risks and benefits of telemedicine (video visit) versus in person care; bill my insurance or make self-payment for services provided; and responsibility for payment of non-covered services.     Mode of Communication:  Video Conference via  [employer]    As the provider I attest to compliance with applicable laws and regulations related to telemedicine.         Patient Participation / Response:  Fully participated with the group by sharing personal reflections / insights and openly received / provided feedback with other participants.    Demonstrated knowledge of when to consider using a variety of coping skills in daily life and Identified 2-3 positive coping strategies that have helped maintain / improve symptoms in the past    Treatment Plan:  Patient has a current master individualized treatment plan.  See Epic treatment plan for more information.    Hannah Kelly, SIDRASW

## 2020-11-23 ENCOUNTER — HOSPITAL ENCOUNTER (OUTPATIENT)
Dept: BEHAVIORAL HEALTH | Facility: CLINIC | Age: 40
End: 2020-11-23
Attending: PSYCHIATRY & NEUROLOGY
Payer: COMMERCIAL

## 2020-11-23 PROCEDURE — 90853 GROUP PSYCHOTHERAPY: CPT | Mod: 95 | Performed by: SOCIAL WORKER

## 2020-11-23 PROCEDURE — G0177 OPPS/PHP; TRAIN & EDUC SERV: HCPCS | Mod: 95

## 2020-11-23 NOTE — ADDENDUM NOTE
Encounter addended by: Keo Middleton MD on: 11/23/2020 8:26 AM   Actions taken: Clinical Note Signed

## 2020-11-23 NOTE — PROGRESS NOTES
Patient Active Problem List   Diagnosis     Major depressive disorder, recurrent episode, moderate with anxious distress (H)       Current Outpatient Medications:      sertraline (ZOLOFT) 50 MG tablet, Take 50 mg by mouth daily, Disp: , Rfl:   Psychiatry staffing: case discussed  Diagnosis:    As above plus VIOLETA, here about 2 weeks.  Move to [facility] from [place] disrupted her

## 2020-11-23 NOTE — GROUP NOTE
Psychotherapy Group Note    PATIENT'S NAME: Kate Jenkins  MRN:   [id number]  :   1980  ACCT. NUMBER: [id number]  DATE OF SERVICE: 20  START TIME: 10:00 AM  END TIME: 10:50 AM  FACILITATOR: Hannah Kelly LICSW  TOPIC: MH EBP Group: Coping Skills  [facility] Adult Mental Health Day Treatment  TRACK: 5A    NUMBER OF PARTICIPANTS: 6    Summary of Group / Topics Discussed:  Coping Skills: Grounding: Patients discussed and practiced strategies to increase attachment / presence to the current moment.  Patients identified situations in which using these strategies will help improve emotion regulation sense of calm in the body.  Reviewed the benefits of applying grounding strategies, as well as past / current practices of each member.  Patients identified situations in which using these strategies would reduce stress. They developed the ability to distinguish when these strategies can be useful in their lives for management and stress and psychological well-being.    Patient Session Goals / Objectives:    Understand the purpose of using grounding strategies to reduce stress.    Verbalize understanding of how and when to apply grounding strategies to reduce distress and increase presence in the moment.    Review patients current grounding practices and discuss a more formal way of practicing and accessing skills.    Practice using various calming strategies (e.g. 5-4-3-2-1; mental and body awareness).    Choose 1-2 grounding strategies to apply during times of distress.    Telemedicine Visit: The patient's condition can be safely assessed and treated via synchronous audio and visual telemedicine encounter.      Reason for Telemedicine Visit: Services only offered telehealth    Originating Site (Patient Location): Patient's home    Distant Site (Provider Location): Provider Remote Setting    Consent:  The patient/guardian has verbally consented to: the potential risks and benefits of  telemedicine (video visit) versus in person care; bill my insurance or make self-payment for services provided; and responsibility for payment of non-covered services.     Mode of Communication:  Video Conference via [employer]    As the provider I attest to compliance with applicable laws and regulations related to telemedicine.       Patient Participation / Response:  Fully participated with the group by sharing personal reflections / insights and openly received / provided feedback with other participants.    Demonstrated knowledge of when to consider using a variety of coping skills in daily life    Treatment Plan:  Patient has a current master individualized treatment plan.  See Epic treatment plan for more information.    Hannah Kelly, [facility]

## 2020-11-23 NOTE — GROUP NOTE
Process Group Note    PATIENT'S NAME: Kate Jenkins  MRN:   [id number]  :   1980  ACCT. NUMBER: [id number]  DATE OF SERVICE: 20  START TIME:  9:00 AM  END TIME:  9:50 AM  FACILITATOR: Hannah Kelly LIC  TOPIC: MH Process Group    Diagnoses:  296.32 (F33.1) Major Depressive Disorder, Recurrent Episode, Moderate _ and With anxious distress  300.02 (F41.1) Generalized Anxiety Disorder.      [facility] Mental Health Day Treatment  TRACK: 5A    NUMBER OF PARTICIPANTS: 6  Telemedicine Visit: The patient's condition can be safely assessed and treated via synchronous audio and visual telemedicine encounter.      Reason for Telemedicine Visit: Services only offered telehealth    Originating Site (Patient Location): Patient's home    Distant Site (Provider Location): Provider Remote Setting    Consent:  The patient/guardian has verbally consented to: the potential risks and benefits of telemedicine (video visit) versus in person care; bill my insurance or make self-payment for services provided; and responsibility for payment of non-covered services.     Mode of Communication:  Video Conference via [employer]    As the provider I attest to compliance with applicable laws and regulations related to telemedicine.           Data:    Session content: At the start of this group, patients were invited to check in by identifying themselves, describing their current emotional status, and identifying issues to address in this group.   Area(s) of treatment focus addressed in this session included Symptom Management, Personal Safety and Community Resources/Discharge Planning.  Kate reported calm mood today.   She stated that he is having a positive long weekend with her daughter in [place].  She stated that she did not have distress or conflict with her current  prior to leaving or during the visit so far.  She stated that she is enjoying her daughter's company.  She reported stable sleep  and appetite.  Client denied suicidal ideation, intent and plan.  She eported some periods of sadness over the weekend.      Therapeutic Interventions/Treatment Strategies:  Psychotherapist offered support, feedback and validation and reinforced use of skills. Treatment modalities used include Cognitive Behavioral Therapy. Interventions include Relationship Skills: Discussed strategies to promote healthier understanding of interpersonal relationships.    Assessment:    Patient response:   Patient responded to session by listening, focusing on goals and being attentive    Possible barriers to participation / learning include: and no barriers identified    Health Issues:   None reported       Substance Use Review:   Substance Use: No active concerns identified.    Mental Status/Behavioral Observations  Appearance:   Appropriate   Eye Contact:   Good   Psychomotor Behavior: Normal   Attitude:   Cooperative  Friendly Pleasant  Orientation:   All  Speech   Rate / Production: Normal    Volume:  Normal   Mood:    Sad  Euthymic  Affect:    Appropriate   Thought Content:   Clear  Thought Form:  Coherent  Logical     Insight:    Good     Plan:     Safety Plan: No current safety concerns identified.  Recommended that patient call 911 or go to the local ED should there be a change in any of these risk factors.     Barriers to treatment: None identified    Patient Contracts (see media tab):  None    Substance Use: Not addressed in session     Continue or Discharge: Patient will continue in Adult Day Treatment (ADT)  as planned. Patient is likely to benefit from learning and using skills as they work toward the goals identified in their treatment plan.      Hannah Kelly, [facility]  November 23, 2020

## 2020-11-23 NOTE — GROUP NOTE
Psychoeducation Group Note    PATIENT'S NAME: Kate Jenkins  MRN:   [id number]  :   1980  ACCT. NUMBER: [id number]  DATE OF SERVICE: 20  START TIME: 11:00 AM  END TIME: 11:50 AM  FACILITATOR: Miriam Ricks RN  TOPIC:  RN Group: [facility]    Telemedicine Visit: The patient's condition can be safely assessed and treated via synchronous audio and visual telemedicine encounter.      Reason for Telemedicine Visit:  covid19    Originating Site (Patient Location): Patient's home    Distant Site (Provider Location): Provider Remote Setting    Consent:  The patient/guardian has verbally consented to: the potential risks and benefits of telemedicine (video visit) versus in person care; bill my insurance or make self-payment for services provided; and responsibility for payment of non-covered services.     Mode of Communication:  Video Conference via [employer]    As the provider I attest to compliance with applicable laws and regulations related to telemedicine.    [facility] Mental Health Day Treatment  TRACK: 5A    NUMBER OF PARTICIPANTS: 5    Summary of Group / Topics Discussed:  Foundations of Health: Nutrition: Super Nutrients & Micronutrients: Super Nutrients are Foods that have high nutritional yield. Micronutrients are essential elements found in food or taken through supplements that are necessary for normal physiological functioning. This group was designed to complement the macronutrients group and build upon previous education. The changes that food makes on the brain (how the brain uses sugar) and nutrition as it relates to mental health was also discussed.       Patient Session Goals / Objectives:  ? Identified the health enhancing benefits to good nutrition  ? Verbalized ways in which the food we eat affects the brain  ? Explained the role of micronutrients in the body, how much we need, and how to get it        Patient Participation / Response:  Fully participated  with the group by sharing personal reflections / insights and openly received / provided feedback with other participants.    Demonstrated understanding of topics discussed through group discussion and participation and Identified / Expressed personal readiness to practice skills    Treatment Plan:  Patient has a current master individualized treatment plan.  See Epic treatment plan for more information.    Miriam Ricks RN

## 2020-11-24 ENCOUNTER — HOSPITAL ENCOUNTER (OUTPATIENT)
Dept: BEHAVIORAL HEALTH | Facility: CLINIC | Age: 40
End: 2020-11-24
Attending: PSYCHIATRY & NEUROLOGY
Payer: COMMERCIAL

## 2020-11-24 PROCEDURE — G0177 OPPS/PHP; TRAIN & EDUC SERV: HCPCS | Mod: 95

## 2020-11-24 PROCEDURE — 90853 GROUP PSYCHOTHERAPY: CPT | Mod: 95 | Performed by: SOCIAL WORKER

## 2020-11-24 PROCEDURE — 90853 GROUP PSYCHOTHERAPY: CPT | Mod: GT | Performed by: SOCIAL WORKER

## 2020-11-24 NOTE — ADDENDUM NOTE
Encounter addended by: Hannah Kelly [facility] on: 11/24/2020 11:18 AM   Actions taken: Pend clinical note

## 2020-11-24 NOTE — GROUP NOTE
Psychoeducation Group Note    PATIENT'S NAME: Kate Jenkins  MRN:   [id number]  :   1980  ACCT. NUMBER: [id number]  DATE OF SERVICE: 20  START TIME: 10:00 AM  END TIME: 10:50 AM  FACILITATOR: Kameron Portillo OTR/L  TOPIC: MH Life Skills Group: Resiliency Development  Telemedicine Visit: The patient's condition can be safely assessed and treated via synchronous audio and visual telemedicine encounter.      Reason for Telemedicine Visit:  COVID-19    Originating Site (Patient Location): Patient's home    Distant Site (Provider Location): [facility]: [facility]    Consent:  The patient/guardian has verbally consented to: the potential risks and benefits of telemedicine (video visit) versus in person care; bill my insurance or make self-payment for services provided; and responsibility for payment of non-covered services.     Mode of Communication:  Video Conference via [employer]    As the provider I attest to compliance with applicable laws and regulations related to telemedicine.   [facility] Adult Mental Health Day Treatment  TRACK: 5A    NUMBER OF PARTICIPANTS: 4    Summary of Group / Topics Discussed:  Resiliency Development:  6 Dimensions of a Healthy Lifestyle to Manage Stress: Patients were taught how to identify stressors, signs of stress, coping skills, and prevention strategies for overall stress management.  Patients were given the opportunity to identify both ongoing and acute mental health symptoms and how to effectively manage these symptoms by developing an effective aftercare plan.  Patients increased awareness of community based resources.    Patient Session Goals / Objectives:    Identified how using coping skills can be used for symptom and stress management       Improved awareness of individualed symptoms and stressors and how to effectively cope     Established a relapse prevention plan to practice these skills in their own environments    Practiced  and reflected on how to generalize taught skills to their everyday life          Patient Participation / Response:  Fully participated with the group by sharing personal reflections / insights and openly received / provided feedback with other participants.    Demonstrated understanding of content through handout/group discussion  and Verbalized understanding of content    Treatment Plan:  Patient has a current master individualized treatment plan.  See Epic treatment plan for more information.    Kameron Portillo, OTR/L

## 2020-11-24 NOTE — GROUP NOTE
Psychotherapy Group Note    PATIENT'S NAME: Kate Jenkins  MRN:   [id number]  :   1980  ACCT. NUMBER: [id number]  DATE OF SERVICE: 20  START TIME: 11:00 AM  END TIME: 11:50 AM  FACILITATOR: Hannah Kelly LICSW  TOPIC: MH EBP Group: Specialty Awareness  [facility] Adult Mental Health Day Treatment  TRACK: 5A    NUMBER OF PARTICIPANTS: 5    Summary of Group / Topics Discussed:  Specialty Topics: Trauma and PTSD: Patients were provided with information to understand the types and origins of trauma, the relationship between trauma and PTSD, the scope of Trauma-Informed Care, and treatment options. Patients were able to explore how trauma may have impacted their functioning directly or indirectly, with reference to the the complexity of trauma and associated treatment options. Patients reviewed their current awareness of this topic and relevance to their functioning. Individual experiences with symptoms and treatment options were discussed.     Patient Session Goals / Objectives:    Discussed definitions of trauma, Trauma-Informed Care, PTSD    Discussed how traumatic experiences affect the individual and their relationships (directly, indirectly, brain development and function)    Identified how a history of trauma or exposure to trauma may impact group work    Assisted patients to find ways to adapt functioning in light of past traumatic experience(s), and to identify suitable treatment options and community resources    Telemedicine Visit: The patient's condition can be safely assessed and treated via synchronous audio and visual telemedicine encounter.      Reason for Telemedicine Visit: Services only offered telehealth    Originating Site (Patient Location): Patient's home    Distant Site (Provider Location): Provider Remote Setting    Consent:  The patient/guardian has verbally consented to: the potential risks and benefits of telemedicine (video visit) versus in person care; bill  my insurance or make self-payment for services provided; and responsibility for payment of non-covered services.     Mode of Communication:  Video Conference via [employer]    As the provider I attest to compliance with applicable laws and regulations related to telemedicine.       Patient Participation / Response:  Fully participated with the group by sharing personal reflections / insights and openly received / provided feedback with other participants.    Identified / Expressed readiness to act on skill suggestions discussed in topic    Treatment Plan:  Patient has a current master individualized treatment plan.  See Epic treatment plan for more information.    Hannah Kelly, SIDRASW

## 2020-11-24 NOTE — GROUP NOTE
Process Group Note    PATIENT'S NAME: Kate Jenkins  MRN:   [id number]  :   1980  ACCT. NUMBER: [id number]  DATE OF SERVICE: 20  START TIME:  9:00 AM  END TIME:  9:50 AM  FACILITATOR: Hannah Kelly [facility]  TOPIC: MH Process Group    Diagnoses:  296.32 (F33.1) Major Depressive Disorder, Recurrent Episode, Moderate _ and With anxious distress  300.02 (F41.1) Generalized Anxiety Disorder.      [facility] Mental Health Day Treatment  TRACK: 5A    NUMBER OF PARTICIPANTS: 4    Telemedicine Visit: The patient's condition can be safely assessed and treated via synchronous audio and visual telemedicine encounter.      Reason for Telemedicine Visit: Services only offered telehealth    Originating Site (Patient Location): Patient's home    Distant Site (Provider Location): Provider Remote Setting    Consent:  The patient/guardian has verbally consented to: the potential risks and benefits of telemedicine (video visit) versus in person care; bill my insurance or make self-payment for services provided; and responsibility for payment of non-covered services.     Mode of Communication:  Video Conference via [employer]    As the provider I attest to compliance with applicable laws and regulations related to telemedicine.           Data:    Session content: At the start of this group, patients were invited to check in by identifying themselves, describing their current emotional status, and identifying issues to address in this group.   Area(s) of treatment focus addressed in this session included Symptom Management, Personal Safety and Community Resources/Discharge Planning.  Kate reported having a good day and transitioning home from the weekend visit with her daughter in [place].  She stated that she is having difficulty feeling like the home is hers also as it was her husbands home with his ex-wife. Peers suggested strategies for increasing the send that the home is hers.  She stated that  she was pleased that she could have a challenging financial discussion with her  about personal and business finances.  Client denied suicidal ideation, intent and plan.     Therapeutic Interventions/Treatment Strategies:  Psychotherapist offered support, feedback and validation and reinforced use of skills. Treatment modalities used include Cognitive Behavioral Therapy. Interventions include Cognitive Restructuring:  Assisted patient in formulating new neutral/positive alternatives to challenge less helpful / ineffective thoughts.    Assessment:    Patient response:   Patient responded to session by focusing on goals, being attentive and accepting support    Possible barriers to participation / learning include: and no barriers identified    Health Issues:   None reported       Substance Use Review:   Substance Use: No active concerns identified.    Mental Status/Behavioral Observations  Appearance:   Appropriate   Eye Contact:   Good   Psychomotor Behavior: Normal   Attitude:   Cooperative   Orientation:   All  Speech   Rate / Production: Normal    Volume:  Normal   Mood:    Normal  Affect:    Appropriate   Thought Content:   Clear  Thought Form:  Coherent  Logical     Insight:    Good     Plan:     Safety Plan: No current safety concerns identified.  Recommended that patient call 911 or go to the local ED should there be a change in any of these risk factors.     Barriers to treatment: None identified    Patient Contracts (see media tab):  None    Substance Use: Not addressed in session     Continue or Discharge: Patient will continue in Adult Day Treatment (ADT)  as planned. Patient is likely to benefit from learning and using skills as they work toward the goals identified in their treatment plan.      Hannah Kelly, [facility]  November 24, 2020

## 2020-11-24 NOTE — ADDENDUM NOTE
Encounter addended by: Hannah Kelly, [facility] on: 11/24/2020 11:48 AM   Actions taken: Clinical Note Signed, Charge Capture section accepted

## 2020-11-24 NOTE — ADDENDUM NOTE
Encounter addended by: Kameron Portillo, OTR/L on: 11/24/2020 11:10 AM   Actions taken: Clinical Note Signed, Charge Capture section accepted

## 2020-11-30 ENCOUNTER — HOSPITAL ENCOUNTER (OUTPATIENT)
Dept: BEHAVIORAL HEALTH | Facility: CLINIC | Age: 40
End: 2020-11-30
Attending: PSYCHIATRY & NEUROLOGY
Payer: COMMERCIAL

## 2020-11-30 PROCEDURE — 90853 GROUP PSYCHOTHERAPY: CPT | Mod: 95 | Performed by: SOCIAL WORKER

## 2020-11-30 PROCEDURE — G0177 OPPS/PHP; TRAIN & EDUC SERV: HCPCS | Mod: 95

## 2020-11-30 PROCEDURE — 90853 GROUP PSYCHOTHERAPY: CPT | Mod: GT | Performed by: SOCIAL WORKER

## 2020-11-30 NOTE — GROUP NOTE
Process Group Note    PATIENT'S NAME: Kate Jenkins  MRN:   [id number]  :   1980  ACCT. NUMBER: [id number]  DATE OF SERVICE: 20  START TIME:  9:00 AM  END TIME:  9:50 AM  FACILITATOR: Hannah Kelly LICSW  TOPIC: MH Process Group    Diagnoses:  296.32 (F33.1) Major Depressive Disorder, Recurrent Episode, Moderate _ and With anxious distress  300.02 (F41.1) Generalized Anxiety Disorder.      [facility] Mental Health Day Treatment  TRACK: 5A    NUMBER OF PARTICIPANTS: 8    Telemedicine Visit: The patient's condition can be safely assessed and treated via synchronous audio and visual telemedicine encounter.      Reason for Telemedicine Visit: Services only offered telehealth    Originating Site (Patient Location): Patient's home    Distant Site (Provider Location): Provider Remote Setting    Consent:  The patient/guardian has verbally consented to: the potential risks and benefits of telemedicine (video visit) versus in person care; bill my insurance or make self-payment for services provided; and responsibility for payment of non-covered services.     Mode of Communication:  Video Conference via [employer]    As the provider I attest to compliance with applicable laws and regulations related to telemedicine.           Data:    Session content: At the start of this group, patients were invited to check in by identifying themselves, describing their current emotional status, and identifying issues to address in this group.   Area(s) of treatment focus addressed in this session included Symptom Management, Personal Safety and Community Resources/Discharge Planning.  Kate reported having a quiet Thanksgiving with hust her .   She stated that [place].  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  She asked peers how to not take on other people's emotions.  Client denied suicidal ideation, intent and plan.     Therapeutic Interventions/Treatment Strategies:  Psychotherapist offered support, feedback and validation and reinforced use of skills. Treatment modalities used include Cognitive Behavioral Therapy. Interventions include Coping Skills: Discussed how the use of intentional \"in the moment\" actions can help reduce distress.    Assessment:    Patient response:   Patient responded to session by listening, focusing on goals, being attentive and accepting support    Possible barriers to participation / learning include: and no barriers identified    Health Issues:   None reported       Substance Use Review:   Substance Use: No active concerns identified.    Mental Status/Behavioral Observations  Appearance:   Appropriate   Eye Contact:   Good   Psychomotor Behavior: Normal   Attitude:   Cooperative  Pleasant  Orientation:   All  Speech   Rate / Production: Normal    Volume:  Normal   Mood:    Anxious  Depressed   Affect:    Tearful  Thought Content:   Clear  Thought Form:  Coherent  Logical     Insight:    Good     Plan:     Safety Plan: No current safety concerns identified.  Recommended that patient call 911 or go to the local ED should there be a change in any of these risk factors.     Barriers to treatment: None identified    Patient Contracts (see media tab):  None    Substance Use: Not addressed in session     Continue or Discharge: Patient will continue in Adult Day Treatment (ADT)  as planned. Patient is likely to benefit from learning and using skills as they work toward the goals identified in their treatment plan.      Hannah Kelly, [facility]  November 30, 2020  "

## 2020-11-30 NOTE — GROUP NOTE
Psychoeducation Group Note    PATIENT'S NAME: Kate Jenkins  MRN:   [id number]  :   1980  ACCT. NUMBER: [id number]  DATE OF SERVICE: 20  START TIME: 11:00 AM  END TIME: 11:50 AM  FACILITATOR: Miriam Ricks RN  TOPIC:  RN Group: Health Maintenance  Telemedicine Visit: The patient's condition can be safely assessed and treated via synchronous audio and visual telemedicine encounter.      Reason for Telemedicine Visit:  covid19    Originating Site (Patient Location): Patient's home    Distant Site (Provider Location): Provider Remote Setting    Consent:  The patient/guardian has verbally consented to: the potential risks and benefits of telemedicine (video visit) versus in person care; bill my insurance or make self-payment for services provided; and responsibility for payment of non-covered services.     Mode of Communication:  Video Conference via [facility]    As the provider I attest to compliance with applicable laws and regulations related to telemedicine.      [facility] Adult Mental Health Day Treatment  TRACK: 5A    NUMBER OF PARTICIPANTS: 8    Summary of Group / Topics Discussed:  Health Maintenance: Discharge planning/Community resources: Patients worked on completing an instructor-facilitated discharge planning activity. Discharge planning begins for all patients after admission. Competent discharge planning promotes a successful transition and decreases the likelihood of mental health relapse. In this group, all dimensions of wellness were reviewed to assess for needs/discharge readiness. These dimensions included: physical, emotional, occupational/productivity, environmental, social, spiritual, intellectual, and financial. Patients worked on completing/updating their discharge planning and identifying their treatment needs prior to time of discharge.     Patient Session Goals / Objectives:  ? Identified unmet treatment needs to accomplish before discharge  ? Completed all  dimensions of the discharge planning packet  ? Participated in the planning process, make phone calls, set up appointments, got connected with community resources, followed up with treatment team as needed         Patient Participation / Response:  Fully participated with the group by sharing personal reflections / insights and openly received / provided feedback with other participants.    Demonstrated understanding of topics discussed through group discussion and participation and Identified / Expressed personal readiness to practice skills    Treatment Plan:  Patient has a current master individualized treatment plan.  See Epic treatment plan for more information.    Miriam Ricks RN

## 2020-11-30 NOTE — GROUP NOTE
Psychotherapy Group Note    PATIENT'S NAME: Kate Jenkins  MRN:   [id number]  :   1980  ACCT. NUMBER: [id number]  DATE OF SERVICE: 20  START TIME: 10:00 AM  END TIME: 10:50 AM  FACILITATOR: Hannah Kelly LICSW  TOPIC: MH EBP Group: Specialty Awareness  [facility] Adult Mental Health Day Treatment  TRACK: 5A    NUMBER OF PARTICIPANTS: 8    Summary of Group / Topics Discussed:  Specialty Topics: Grief/Transitions: Patients received an overview of the grief process.  Patients explored their relationship to loss and how that has affected their mental health symptoms.  Strategies for recognizing loss and ideas for engaging in the grief process was presented and discussed.  Patients identified needs for support and coping skills to manage loss.  The purpose of this specialty topic is to help patients identify the aspects of change due to loss that individuals experience in addition to mental health symptoms to better cope with the grief, loss, and life transitions.      Patient Session Goals / Objectives:    Identified grief, loss, and life transitions     Discussed how grief impacts mental health symptoms and disrupts usual functioning    Identified needs for support and coping with grief and planned further action for coping    Telemedicine Visit: The patient's condition can be safely assessed and treated via synchronous audio and visual telemedicine encounter.      Reason for Telemedicine Visit: Services only offered telehealth    Originating Site (Patient Location): Patient's home    Distant Site (Provider Location): Provider Remote Setting    Consent:  The patient/guardian has verbally consented to: the potential risks and benefits of telemedicine (video visit) versus in person care; bill my insurance or make self-payment for services provided; and responsibility for payment of non-covered services.     Mode of Communication:  Video Conference via [employer]    As the provider I  attest to compliance with applicable laws and regulations related to telemedicine.         Patient Participation / Response:  Fully participated with the group by sharing personal reflections / insights and openly received / provided feedback with other participants.    Identified / Expressed readiness to act on skill suggestions discussed in topic and Verbalized understanding of ways to proactively manage illness    Treatment Plan:  Patient has a current master individualized treatment plan.  See Epic treatment plan for more information.    Hannah Kelly, LICSW

## 2020-12-01 ENCOUNTER — HOSPITAL ENCOUNTER (OUTPATIENT)
Dept: BEHAVIORAL HEALTH | Facility: CLINIC | Age: 40
End: 2020-12-01
Attending: PSYCHIATRY & NEUROLOGY
Payer: COMMERCIAL

## 2020-12-01 PROCEDURE — G0177 OPPS/PHP; TRAIN & EDUC SERV: HCPCS | Mod: GT

## 2020-12-01 PROCEDURE — 90853 GROUP PSYCHOTHERAPY: CPT | Mod: GT | Performed by: SOCIAL WORKER

## 2020-12-01 NOTE — GROUP NOTE
Psychoeducation Group Note    PATIENT'S NAME: Kate Jenkins  MRN:   [id number]  :   1980  ACCT. NUMBER: [id number]  DATE OF SERVICE: 20  START TIME: 10:00 AM  END TIME: 10:50 AM  FACILITATOR: Kameron Portillo OTR/L  TOPIC: MH Life Skills Group: Communication and Social Skills Development  Telemedicine Visit: The patient's condition can be safely assessed and treated via synchronous audio and visual telemedicine encounter.      Reason for Telemedicine Visit: COVID-19    Originating Site (Patient Location): Patient's home    Distant Site (Provider Location): [facility]: [facility]    Consent:  The patient/guardian has verbally consented to: the potential risks and benefits of telemedicine (video visit) versus in person care; bill my insurance or make self-payment for services provided; and responsibility for payment of non-covered services.     Mode of Communication:  Video Conference via [employer]    As the provider I attest to compliance with applicable laws and regulations related to telemedicine.   [facility] Adult Mental Health Day Treatment  TRACK: 5A    NUMBER OF PARTICIPANTS: 7    Summary of Group / Topics Discussed:  Communication and Social Skills Development: Social Supports: Social Support Scale: Patients completed a social support self assessment to identify people who are supportive and to evaluate the effectiveness of their social support system.  Patients were taught and gained awareness of characteristics of an effective support and how to develop this in their lives.  Patients identified both personal strengths and opportunities for growth in this areas to improve overall communication and connection with other people.    Patient Session Goals / Objectives:    Identified strengths and opportunities for growth in developing their social support system and how this impacts their ability to connect and communicate with other people       Improved awareness  of important aspects of social support systems and how this relates to mental health recovery        Established a plan for practice of these skills in their own environments    Practiced and reflected on how to generalize taught skills to their everyday life          Patient Participation / Response:  Fully participated with the group by sharing personal reflections / insights and openly received / provided feedback with other participants.    Demonstrated understanding of content through handout/group discussion  and Verbalized understanding of content    Treatment Plan:  Patient has a current master individualized treatment plan.  See Epic treatment plan for more information.    Kameron Portillo, OTR/L

## 2020-12-01 NOTE — GROUP NOTE
Process Group Note    PATIENT'S NAME: Kate Jenkins  MRN:   [id number]  :   1980  ACCT. NUMBER: [id number]  DATE OF SERVICE: 20  START TIME:  9:00 AM  END TIME:  9:50 AM  FACILITATOR: Hannah Kelly LICSW  TOPIC: MH Process Group    Diagnoses:  296.32 (F33.1) Major Depressive Disorder, Recurrent Episode, Moderate _ and With anxious distress  300.02 (F41.1) Generalized Anxiety Disorder.      [facility] Mental Health Day Treatment  TRACK: 5A    NUMBER OF PARTICIPANTS: 7    Telemedicine Visit: The patient's condition can be safely assessed and treated via synchronous audio and visual telemedicine encounter.      Reason for Telemedicine Visit: Services only offered telehealth    Originating Site (Patient Location): Patient's home    Distant Site (Provider Location): Provider Remote Setting    Consent:  The patient/guardian has verbally consented to: the potential risks and benefits of telemedicine (video visit) versus in person care; bill my insurance or make self-payment for services provided; and responsibility for payment of non-covered services.     Mode of Communication:  Video Conference via [employer]    As the provider I attest to compliance with applicable laws and regulations related to telemedicine.           Data:    Session content: At the start of this group, patients were invited to check in by identifying themselves, describing their current emotional status, and identifying issues to address in this group.   Area(s) of treatment focus addressed in this session included Symptom Management, Personal Safety and Community Resources/Discharge Planning.  Kate reported addressing the conflict with her  as planned.   She stated that she got positive feedback about speaking up for her needs.   She stated that she took time to herself as self-care yesterday.   She noticed the difference between doing that and being isolative.  Client denied suicidal ideation, intent  and plan.     Therapeutic Interventions/Treatment Strategies:  Psychotherapist offered support, feedback and validation and reinforced use of skills. Treatment modalities used include Cognitive Behavioral Therapy. Interventions include Cognitive Restructuring:  Assisted patient in formulating new neutral/positive alternatives to challenge less helpful / ineffective thoughts.    Assessment:    Patient response:   Patient responded to session by focusing on goals and being attentive    Possible barriers to participation / learning include: and no barriers identified    Health Issues:   None reported       Substance Use Review:   Substance Use: No active concerns identified.    Mental Status/Behavioral Observations  Appearance:   Appropriate   Eye Contact:   Good   Psychomotor Behavior: Normal   Attitude:   Cooperative   Orientation:   All  Speech   Rate / Production: Normal    Volume:  Normal   Mood:    Anxious  Depressed   Affect:    Appropriate  Tearful  Thought Content:   Clear  Thought Form:  Coherent  Logical     Insight:    Good     Plan:     Safety Plan: No current safety concerns identified.  Recommended that patient call 911 or go to the local ED should there be a change in any of these risk factors.     Barriers to treatment: None identified    Patient Contracts (see media tab):  None    Substance Use: Not addressed in session     Continue or Discharge: Patient will continue in Adult Day Treatment (ADT)  as planned. Patient is likely to benefit from learning and using skills as they work toward the goals identified in their treatment plan.      Hannah Kelly, [facility]  December 1, 2020

## 2020-12-01 NOTE — GROUP NOTE
Psychotherapy Group Note    PATIENT'S NAME: Kate Jenkins  MRN:   [id number]  :   1980  ACCT. NUMBER: [id number]  DATE OF SERVICE: 20  START TIME: 11:00 AM  END TIME: 11:50 AM  FACILITATOR: Hannah Kelly LICSW  TOPIC: MH EBP Group: Specialty Awareness  [facility] Adult Mental Health Day Treatment  TRACK: 5A    NUMBER OF PARTICIPANTS: 7    Summary of Group / Topics Discussed:  Specialty Topics: Life Transitions: The topic of life transitions was presented in order to help patients to better understand the challenges presented by life transitions, and how to best navigate them. Exploring the phases of transition and how one works through them was discussed. Patients were provided with information regarding community resources.     Patient Session Goals / Objectives:    Discussed the timing and nature of major life transitions    Explored how life transitions may impact mental health and functioning    Discussed coping strategies to manage symptoms and help with transitioning    Discussed and planned a successful transition  Telemedicine Visit: The patient's condition can be safely assessed and treated via synchronous audio and visual telemedicine encounter.      Reason for Telemedicine Visit: Services only offered telehealth    Originating Site (Patient Location): Patient's home    Distant Site (Provider Location): Provider Remote Setting    Consent:  The patient/guardian has verbally consented to: the potential risks and benefits of telemedicine (video visit) versus in person care; bill my insurance or make self-payment for services provided; and responsibility for payment of non-covered services.     Mode of Communication:  Video Conference via [employer]    As the provider I attest to compliance with applicable laws and regulations related to telemedicine.       Patient Participation / Response:  Fully participated with the group by sharing personal reflections / insights and  openly received / provided feedback with other participants.    Identified / Expressed readiness to act on skill suggestions discussed in topic    Treatment Plan:  Patient has a current master individualized treatment plan.  See Epic treatment plan for more information.    Hannah Kelly, [facility]

## 2020-12-03 ENCOUNTER — HOSPITAL ENCOUNTER (OUTPATIENT)
Dept: BEHAVIORAL HEALTH | Facility: CLINIC | Age: 40
End: 2020-12-03
Attending: PSYCHIATRY & NEUROLOGY
Payer: COMMERCIAL

## 2020-12-03 PROCEDURE — 90853 GROUP PSYCHOTHERAPY: CPT | Mod: GT | Performed by: SOCIAL WORKER

## 2020-12-03 NOTE — GROUP NOTE
Psychotherapy Group Note    PATIENT'S NAME: Kate Jenkins  MRN:   [id number]  :   1980  ACCT. NUMBER: [id number]  DATE OF SERVICE: 20  START TIME: 11:00 AM  END TIME: 11:50 AM  FACILITATOR: Hannah Dodge LICSW  TOPIC:  EBP Group: Enhanced Mindfulness  [facility] Adult Dual Diagnosis Day Treatment  TRACK: 5A    NUMBER OF PARTICIPANTS: 6    Summary of Group / Topics Discussed:  Enhanced Mindfulness: Body and Mind Integration: Patients received an overview and education regarding the importance of including the body in the management of emotional health and self-care and as a direct route to mindfulness practice.  Patients discussed various ways in which the body can serve as an informant to their physical and emotional experiences/need. Patients discussed the body as a direct link to the present moment and to mindfulness practice.  Patients discussed current relationship with body, self-awareness, mindfulness practice and barriers to connection with body.  Patients were guided through breath work and movement to facilitate greater self-awareness, grounding, self-expression, and connection to other.  Patients discussed how the experiential could be applied to better manage mental health and develop noyola connection to self.    Patient Session Goals / Objectives:    Identify how movement awareness could be used for grounding, stress management, self-expression, connection to other and self-regulation    Improved awareness of breath and movement preferences    Identify how movement and the body is used in mindfulness practice    Reflect on use of these practices in everyday life.    Identify barriers to attending to body    Telemedicine Visit: The patient's condition can be safely assessed and treated via synchronous audio and visual telemedicine encounter.          Reason for Telemedicine Visit: Services only offered telehealth and covid19        Originating Site (Patient Location): Patient's  home        Distant Site (Provider Location): Provider Remote Setting        Consent:  The patient/guardian has verbally consented to: the potential risks and benefits of telemedicine (video visit) versus in person care; bill my insurance or make self-payment for services provided; and responsibility for payment of non-covered services.         Mode of Communication:  Video Conference via [employer]        As the provider I attest to compliance with applicable laws and regulations related to telemedicine.         Patient Participation / Response:  Fully participated with the group by sharing personal reflections / insights and openly received / provided feedback with other participants.    Demonstrated understanding of topics discussed through group discussion and participation, Identified / Expressed readiness to act on skill suggestions discussed in topic and Practiced skills in session    Treatment Plan:  Patient has a current master individualized treatment plan.  See Epic treatment plan for more information.    SIDRA PaulaSW

## 2020-12-03 NOTE — GROUP NOTE
Psychotherapy Group Note    PATIENT'S NAME: Kate Jenkins  MRN:   [id number]  :   1980  ACCT. NUMBER: [id number]  DATE OF SERVICE: 20  START TIME: 10:00 AM  END TIME: 10:50 AM  FACILITATOR: Hannah Kelly LICSW  TOPIC: MH EBP Group: Emotions Management  [facility] Mental Health Day Treatment  TRACK: 5A    NUMBER OF PARTICIPANTS: 7    Summary of Group / Topics Discussed:  Emotions Management: Check Facts: Patients participated in an interactive approach to identifying and challenging cognitive distortions that arise following an event that triggers intense emotion.  Patients choose an emotional reaction/event to work on.  The group shared their experiences and thought processes for feedback.      Patient Session Goals / Objectives:    Learn the process of identifying thoughts associated with the situation and reaction    Learn to challenge cognitive distortions and reframe the situation/event/reaction     Distinguish between facts, feelings, thoughts    Gain understanding of how to interpret an emotional reaction    Practice identification of cognitive distortions and evaluating an emotionally charged situation more rationally/objectively/mindfully  Telemedicine Visit: The patient's condition can be safely assessed and treated via synchronous audio and visual telemedicine encounter.      Reason for Telemedicine Visit: Services only offered telehealth    Originating Site (Patient Location): Patient's home    Distant Site (Provider Location): Provider Remote Setting    Consent:  The patient/guardian has verbally consented to: the potential risks and benefits of telemedicine (video visit) versus in person care; bill my insurance or make self-payment for services provided; and responsibility for payment of non-covered services.     Mode of Communication:  Video Conference via [employer]    As the provider I attest to compliance with applicable laws and regulations related to  telemedicine.       Patient Participation / Response:  Fully participated with the group by sharing personal reflections / insights and openly received / provided feedback with other participants.    Identified emotions management strategies that have helped maintain / improve symptoms in the past    Treatment Plan:  Patient has a current master individualized treatment plan.  See Epic treatment plan for more information.    Hannah Kelly, SIDRASW

## 2020-12-03 NOTE — GROUP NOTE
Process Group Note    PATIENT'S NAME: Kate Jenkins  MRN:   [id number]  :   1980  ACCT. NUMBER: [id number]  DATE OF SERVICE: 20  START TIME:  9:00 AM  END TIME:  9:50 AM  FACILITATOR: Hannah Kelly LICSW  TOPIC: MH Process Group    Diagnoses:  296.32 (F33.1) Major Depressive Disorder, Recurrent Episode, Moderate _ and With anxious distress  300.02 (F41.1) Generalized Anxiety Disorder.      [facility] Mental Health Day Treatment  TRACK: 5A    NUMBER OF PARTICIPANTS: 5    Telemedicine Visit: The patient's condition can be safely assessed and treated via synchronous audio and visual telemedicine encounter.      Reason for Telemedicine Visit: Services only offered telehealth    Originating Site (Patient Location): Patient's home    Distant Site (Provider Location): Provider Remote Setting    Consent:  The patient/guardian has verbally consented to: the potential risks and benefits of telemedicine (video visit) versus in person care; bill my insurance or make self-payment for services provided; and responsibility for payment of non-covered services.     Mode of Communication:  Video Conference via [employer]    As the provider I attest to compliance with applicable laws and regulations related to telemedicine.           Data:    Session content: At the start of this group, patients were invited to check in by identifying themselves, describing their current emotional status, and identifying issues to address in this group.   Area(s) of treatment focus addressed in this session included Symptom Management, Personal Safety and Community Resources/Discharge Planning.  Kate reported improved mood today.  She stated that she was able to move beyond a symptom check, in with her individual therapist and this felt good.   She stated that she was given the homework of writing her own treatment plan as if she was observing herself.  She stated that she felt pleased about the switch to focus  on increased self-awareness.  Motivation and energy were stable.   Sleep was stable.  Client denied suicidal ideation, intent and plan.     Therapeutic Interventions/Treatment Strategies:  Psychotherapist offered support, feedback and validation and reinforced use of skills. Treatment modalities used include Cognitive Behavioral Therapy. Interventions include Relationship Skills: Discussed strategies to promote healthier understanding of interpersonal relationships.    Assessment:    Patient response:   Patient responded to session by listening, focusing on goals and being attentive    Possible barriers to participation / learning include: and no barriers identified    Health Issues:   None reported       Substance Use Review:   Substance Use: No active concerns identified.    Mental Status/Behavioral Observations  Appearance:   Appropriate   Eye Contact:   Good   Psychomotor Behavior: Normal   Attitude:   Cooperative   Orientation:   All  Speech   Rate / Production: Normal    Volume:  Normal   Mood:    Anxious  Depressed   Affect:    Appropriate   Thought Content:   Clear  Thought Form:  Coherent  Logical     Insight:    Good     Plan:     Safety Plan: No current safety concerns identified.  Recommended that patient call 911 or go to the local ED should there be a change in any of these risk factors.     Barriers to treatment: None identified    Patient Contracts (see media tab):  None    Substance Use: Not addressed in session     Continue or Discharge: Patient will continue in Adult Day Treatment (ADT)  as planned. Patient is likely to benefit from learning and using skills as they work toward the goals identified in their treatment plan.      Hannah Kelly, [facility]  December 3, 2020

## 2020-12-07 ENCOUNTER — HOSPITAL ENCOUNTER (OUTPATIENT)
Dept: BEHAVIORAL HEALTH | Facility: CLINIC | Age: 40
End: 2020-12-07
Attending: PSYCHIATRY & NEUROLOGY
Payer: COMMERCIAL

## 2020-12-07 PROCEDURE — 90853 GROUP PSYCHOTHERAPY: CPT | Mod: GT

## 2020-12-07 PROCEDURE — G0177 OPPS/PHP; TRAIN & EDUC SERV: HCPCS | Mod: GT

## 2020-12-07 NOTE — GROUP NOTE
Psychoeducation Group Note    PATIENT'S NAME: Kate Jenkins  MRN:   [id number]  :   1980  ACCT. NUMBER: [id number]  DATE OF SERVICE: 20  START TIME: 10:00 AM  END TIME: 10:50 AM  FACILITATOR: Kameron Portillo OTR/L  TOPIC: MH Life Skills Group: Resiliency Development  Telemedicine Visit: The patient's condition can be safely assessed and treated via synchronous audio and visual telemedicine encounter.      Reason for Telemedicine Visit:  COVID-19    Originating Site (Patient Location): Patient's home    Distant Site (Provider Location): [facility]: [facility]    Consent:  The patient/guardian has verbally consented to: the potential risks and benefits of telemedicine (video visit) versus in person care; bill my insurance or make self-payment for services provided; and responsibility for payment of non-covered services.     Mode of Communication:  Video Conference via CNZZ    As the provider I attest to compliance with applicable laws and regulations related to telemedicine.   [facility] Adult Mental Health Day Treatment  TRACK: 5A    NUMBER OF PARTICIPANTS: 6    Summary of Group / Topics Discussed:  Resiliency Development:  Resources to [place] with Stress: Patients were taught how to identify stressors, signs of stress, coping skills, and prevention strategies for overall stress management.  Patients were given the opportunity to identify both ongoing and acute mental health symptoms and how to effectively manage these symptoms by developing an effective aftercare plan.  Patients increased awareness of community based resources.    Patient Session Goals / Objectives:    Identified how using coping skills can be used for symptom and stress management       Improved awareness of individualed symptoms and stressors and how to effectively cope     Established a relapse prevention plan to practice these skills in their own environments    Practiced and reflected on how to  generalize taught skills to their everyday life          Patient Participation / Response:  Fully participated with the group by sharing personal reflections / insights and openly received / provided feedback with other participants.    Demonstrated understanding of content through handout/group discussion , Verbalized understanding of content and Patient would benefit from additional opportunities to practice the content to be able to generalize it to their everyday life with increased intentionality, consistency, and efficacy in support of their psychiatric recovery    Treatment Plan:  Patient has a current master individualized treatment plan.  See Epic treatment plan for more information.    Kameron Portillo, OTR/L

## 2020-12-07 NOTE — GROUP NOTE
Process Group Note    PATIENT'S NAME: Kate Jenkins  MRN:   [id number]  :   1980  ACCT. NUMBER: [id number]  DATE OF SERVICE: 20  START TIME:  9:00 AM  END TIME:  9:50 AM  FACILITATOR: Margarita Watson LICSW  TOPIC:  Process Group    Diagnoses:  296.32 (F33.1) Major Depressive Disorder, Recurrent Episode, Moderate _ and With anxious distress  300.02 (F41.1) Generalized Anxiety Disorder.      [facility] Adult Mental Health Day Treatment  TRACK: 5A    Telemedicine Visit: The patient's condition can be safely assessed and treated via synchronous audio and visual telemedicine encounter.      Reason for Telemedicine Visit: Services only offered telehealth    Originating Site (Patient Location): Patient's home    Distant Site (Provider Location): [facility]: [facility]    Consent:  The patient/guardian has verbally consented to: the potential risks and benefits of telemedicine (video visit) versus in person care; bill my insurance or make self-payment for services provided; and responsibility for payment of non-covered services.     Mode of Communication:  Video Conference via [employer]    As the provider I attest to compliance with applicable laws and regulations related to telemedicine.      NUMBER OF PARTICIPANTS: 7          Data:    Session content: At the start of this group, patients were invited to check in by identifying themselves, describing their current emotional status, and identifying issues to address in this group.   Area(s) of treatment focus addressed in this session included Symptom Management and Personal Safety.  Pt reports anxiety about housing inspection today. She regrets not preparing for this ahead of time, but acknowledges that she is not ruminating about this, like she would have before the group. Pt is grateful for time with teen daughter this weekend. Denies safety concerns.    Therapeutic Interventions/Treatment Strategies:  Psychotherapist offered  support, feedback and validation and reinforced use of skills. Treatment modalities used include Cognitive Behavioral Therapy. Interventions include Coping Skills: Discussed use of self-soothe skills to decrease distress in the body.    Assessment:    Patient response:   Patient responded to session by accepting feedback, giving feedback, listening, focusing on goals, being attentive and accepting support    Possible barriers to participation / learning include: and no barriers identified    Health Issues:   None reported       Substance Use Review:   Substance Use: No active concerns identified.    Mental Status/Behavioral Observations  Appearance:   Appropriate   Eye Contact:   Good   Psychomotor Behavior: Normal   Attitude:   Cooperative   Orientation:   All  Speech   Rate / Production: Normal    Volume:  Normal   Mood:    Anxious   Affect:    Appropriate   Thought Content:   Clear  Thought Form:  Coherent  Logical     Insight:    Good     Plan:     Safety Plan: No current safety concerns identified.  Recommended that patient call 911 or go to the local ED should there be a change in any of these risk factors.     Barriers to treatment: None identified    Patient Contracts (see media tab):  None    Substance Use: Not addressed in session     Continue or Discharge: Patient will continue in Adult Day Treatment (ADT)  as planned. Patient is likely to benefit from learning and using skills as they work toward the goals identified in their treatment plan.      Margarita Anderson, [facility] Center[facility]  December 7, 2020

## 2020-12-07 NOTE — GROUP NOTE
Psychoeducation Group Note    PATIENT'S NAME: Kate Jenkins  MRN:   [id number]  :   1980  ACCT. NUMBER: [id number]  DATE OF SERVICE: 20  START TIME: 11:00 AM  END TIME: 11:50 AM  FACILITATOR: Miriam Ricks RN  TOPIC: MICAH RN Group: [facility]  Telemedicine Visit: The patient's condition can be safely assessed and treated via synchronous audio and visual telemedicine encounter.      Reason for Telemedicine Visit:  covid19    Originating Site (Patient Location): Patient's home    Distant Site (Provider Location): Provider Remote Setting    Consent:  The patient/guardian has verbally consented to: the potential risks and benefits of telemedicine (video visit) versus in person care; bill my insurance or make self-payment for services provided; and responsibility for payment of non-covered services.     Mode of Communication:  Video Conference via [employer]lio    As the provider I attest to compliance with applicable laws and regulations related to telemedicine.      [facility] Adult Mental Health Day Treatment  TRACK: 5A    NUMBER OF PARTICIPANTS: 5    Summary of Group / Topics Discussed:  Foundations of Health: Sleep: Pathophysiology of sleep disorders: The anatomy of sleep was reviewed including structures, stages, mechanisms, and the purpose that sleep has on the brain. Sleep disorders were discussed within the group with a focus on gaining knowledge about the etiology and pathophysiology of insomnia, sleep apnea, restless leg syndrome, narcolepsy, medications that can cause risk for sleeping disorders, and other symptoms/factors that may interfere with sleep. Risk factors for developing sleep disorders were discussed and treatments/pharmacological options were explored.     Patient Session Goals / Objectives:  ? Described the effect and purpose of sleep on the brain and identify the amount of sleep needed  ? Identified differences between sleep disorders and risks associated with  sleep disorders  ? Described the connection between sleep disturbances and mental illness    Increased knowledge about treatments for sleep disorders      Patient Participation / Response:  Fully participated with the group by sharing personal reflections / insights and openly received / provided feedback with other participants.    Demonstrated understanding of topics discussed through group discussion and participation and Identified / Expressed personal readiness to practice skills    Treatment Plan:  Patient has a current master individualized treatment plan.  See Epic treatment plan for more information.    Miriam Ricks RN

## 2020-12-08 ENCOUNTER — HOSPITAL ENCOUNTER (OUTPATIENT)
Dept: BEHAVIORAL HEALTH | Facility: CLINIC | Age: 40
End: 2020-12-08
Attending: PSYCHIATRY & NEUROLOGY
Payer: COMMERCIAL

## 2020-12-08 PROCEDURE — G0177 OPPS/PHP; TRAIN & EDUC SERV: HCPCS | Mod: GT

## 2020-12-08 PROCEDURE — 90853 GROUP PSYCHOTHERAPY: CPT | Mod: GT

## 2020-12-08 NOTE — GROUP NOTE
"Process Group Note    PATIENT'S NAME: Kate Jenkins  MRN:   [id number]  :   1980  ACCT. NUMBER: [id number]  DATE OF SERVICE: 20  START TIME:  9:00 AM  END TIME:  9:50 AM  FACILITATOR: Margarita Watson LICSW  TOPIC:  Process Group    Diagnoses:  296.32 (F33.1) Major Depressive Disorder, Recurrent Episode, Moderate _ and With anxious distress  300.02 (F41.1) Generalized Anxiety Disorder.      [facility] Adult Mental Health Day Treatment  TRACK: 5A    Telemedicine Visit: The patient's condition can be safely assessed and treated via synchronous audio and visual telemedicine encounter.      Reason for Telemedicine Visit: Services only offered telehealth    Originating Site (Patient Location): Patient's home    Distant Site (Provider Location): [facility]: [facility]    Consent:  The patient/guardian has verbally consented to: the potential risks and benefits of telemedicine (video visit) versus in person care; bill my insurance or make self-payment for services provided; and responsibility for payment of non-covered services.     Mode of Communication:  Video Conference via [employer]    As the provider I attest to compliance with applicable laws and regulations related to telemedicine.      NUMBER OF PARTICIPANTS: 6          Data:    Session content: At the start of this group, patients were invited to check in by identifying themselves, describing their current emotional status, and identifying issues to address in this group.   Area(s) of treatment focus addressed in this session included Symptom Management and Personal Safety.  Pt remains in [place] residence today. She reports feeling relieved that housing inspection went well yesterday and proud of how she handled her anxiety. Pt reports enjoying time with daughter, but struggles with time with . States that her and her  are \"trying to navigate different communication styles\". [place] Denies safety concerns.    Therapeutic Interventions/Treatment Strategies:  Psychotherapist offered support, feedback and validation and reinforced use of skills. Treatment modalities used include Cognitive Behavioral Therapy. Interventions include Coping Skills: Discussed use of self-soothe skills to decrease distress in the body and Relationship Skills: Encouraged development and maintenance  of healthy boundaries.    Assessment:    Patient response:   Patient responded to session by accepting feedback, giving feedback, listening, focusing on goals, being attentive and accepting support    Possible barriers to participation / learning include: and no barriers identified    Health Issues:   None reported       Substance Use Review:   Substance Use: No active concerns identified. and alcohol .     Mental Status/Behavioral Observations  Appearance:   Appropriate   Eye Contact:   Good   Psychomotor Behavior: Normal   Attitude:   Cooperative   Orientation:   All  Speech   Rate / Production: Normal    Volume:  Normal   Mood:    Anxious   Affect:    Appropriate   Thought Content:   Clear  Thought Form:  Coherent  Logical     Insight:    Good     Plan:     Safety Plan: No current safety concerns identified.  Recommended that patient call 911 or go to the local ED should there be a change in any of these risk factors.     Barriers to treatment: None identified    Patient Contracts (see media tab):  None    Substance Use: Not addressed in session     Continue or Discharge: Patient will continue in Adult Day Treatment (ADT)  as planned. Patient is likely to benefit from learning and using skills as they work toward the goals identified in their treatment plan.      Margarita Anderson, LICSW  December 8, 2020

## 2020-12-08 NOTE — GROUP NOTE
Psychoeducation Group Note    PATIENT'S NAME: Kate Jenkins  MRN:   [id number]  :   1980  ACCT. NUMBER: [id number]  DATE OF SERVICE: 20  START TIME: 10:00 AM  END TIME: 10:50 AM  FACILITATOR: Kameron Portillo OTR/L  TOPIC: MH Life Skills Group: Lifestyle Balance and Structure  Telemedicine Visit: The patient's condition can be safely assessed and treated via synchronous audio and visual telemedicine encounter.      Reason for Telemedicine Visit: COVID-19    Originating Site (Patient Location): Patient's home    Distant Site (Provider Location): [facility]: [facility]    Consent:  The patient/guardian has verbally consented to: the potential risks and benefits of telemedicine (video visit) versus in person care; bill my insurance or make self-payment for services provided; and responsibility for payment of non-covered services.     Mode of Communication:  Video Conference via [employer]    As the provider I attest to compliance with applicable laws and regulations related to telemedicine.   [facility] Adult Mental Health Day Treatment  TRACK: 5A    NUMBER OF PARTICIPANTS: 6    Summary of Group / Topics Discussed:  Lifestyle Balance and Strucure: Personal Recovery Outcome Measure: Patients were assisted to identify meaningful roles that they want to promote and the impact their mental health symptoms have on this.  Patients learned, applied, and generalized skills needed to live and participate in meaningful roles as effectively and independently as possible.  Patients developed awareness of strengths and challenges in fulfilling these roles and worked on integrating specific and individualized rituals and habits into their daily life.     Patient Session Goals / Objectives:    Improved awareness and engagement in life s meaningful roles, routines, habits, and rituals    Explored and identified current roles and challenges due to mental health symptoms     Identified ways  to establish and integrate daily self care and wellness routines and habits to support mental health recovery    Practiced and reflected on how to generalize taught skills to their everyday life      Patient Participation / Response:  Fully participated with the group by sharing personal reflections / insights and openly received / provided feedback with other participants.    Demonstrated understanding of content through handout/group discussion , Verbalized understanding of content and Patient would benefit from additional opportunities to practice the content to be able to generalize it to their everyday life with increased intentionality, consistency, and efficacy in support of their psychiatric recovery    Treatment Plan:  Patient has a current master individualized treatment plan.  See Epic treatment plan for more information.    Kameron Portillo, OTR/L

## 2020-12-08 NOTE — GROUP NOTE
Psychotherapy Group Note    PATIENT'S NAME: Kate Jenkins  MRN:   [id number]  :   1980  ACCT. NUMBER: [id number]  DATE OF SERVICE: 20  START TIME: 11:00 AM  END TIME: 11:50 AM  FACILITATOR: Margarita Watson LICSW  TOPIC: MH EBP Group: Specialty Awareness  [facility] Adult Mental Health Day Treatment  TRACK: 5A    Telemedicine Visit: The patient's condition can be safely assessed and treated via synchronous audio and visual telemedicine encounter.      Reason for Telemedicine Visit: Services only offered telehealth    Originating Site (Patient Location): Patient's home    Distant Site (Provider Location): [facility]    Consent:  The patient/guardian has verbally consented to: the potential risks and benefits of telemedicine (video visit) versus in person care; bill my insurance or make self-payment for services provided; and responsibility for payment of non-covered services.     Mode of Communication:  Video Conference via [employer]    As the provider I attest to compliance with applicable laws and regulations related to telemedicine.      NUMBER OF PARTICIPANTS: 6    Summary of Group / Topics Discussed:  Specialty Topics: Hope: The topic of hope was presented in order to help patients better understand the symptoms of hopelessness and how to become more hopeful. Patients discussed their current awareness of the topic and relevance to their functioning. Individual experiences with symptoms and treatment options were also discussed. Patients explored options for ongoing/future treatment and symptom management.      Patient Session Goals / Objectives:    Discussed definition of hopelessness    Discussed how hopelessness impacts functioning    Set a plan to utilize skills to reduce hopelessness        Patient Participation / Response:  Fully participated with the group by sharing personal reflections / insights and openly received / provided feedback with other  participants.    Demonstrated understanding of topics discussed through group discussion and participation    Treatment Plan:  Patient has a current master individualized treatment plan.  See Epic treatment plan for more information.    Margarita Anderson, SIDRASW

## 2020-12-10 ENCOUNTER — HOSPITAL ENCOUNTER (OUTPATIENT)
Dept: BEHAVIORAL HEALTH | Facility: CLINIC | Age: 40
End: 2020-12-10
Attending: PSYCHIATRY & NEUROLOGY
Payer: COMMERCIAL

## 2020-12-10 PROCEDURE — 90853 GROUP PSYCHOTHERAPY: CPT | Mod: GT | Performed by: SOCIAL WORKER

## 2020-12-10 NOTE — GROUP NOTE
Psychotherapy Group Note    PATIENT'S NAME: Kate Jenkins  MRN:   [id number]  :   1980  ACCT. NUMBER: [id number]  DATE OF SERVICE: 12/10/20  START TIME: 11:00 AM  END TIME: 11:50 AM  FACILITATOR: Hannah Dodge LICSW  TOPIC:  EBP Group: Enhanced Mindfulness  [facility] Adult Mental Health Day Treatment  TRACK: 5A    NUMBER OF PARTICIPANTS: 4    Summary of Group / Topics Discussed:  Enhanced Mindfulness: Body and Mind Integration: Patients received an overview and education regarding the importance of including the body in the management of emotional health and self-care and as a direct route to mindfulness practice.  Patients discussed various ways in which the body can serve as an informant to their physical and emotional experiences/need. Patients discussed the body as a direct link to the present moment and to mindfulness practice.  Patients discussed current relationship with body, self-awareness, mindfulness practice and barriers to connection with body.  Patients were guided through breath work and movement to facilitate greater self-awareness, grounding, self-expression, and connection to other.  Patients discussed how the experiential could be applied to better manage mental health and develop noyola connection to self.    Patient Session Goals / Objectives:    Identify how movement awareness could be used for grounding, stress management, self-expression, connection to other and self-regulation    Improved awareness of breath and movement preferences    Identify how movement and the body is used in mindfulness practice    Reflect on use of these practices in everyday life.    Identify barriers to attending to body    Telemedicine Visit: The patient's condition can be safely assessed and treated via synchronous audio and visual telemedicine encounter.          Reason for Telemedicine Visit: Services only offered telehealth and covid19        Originating Site (Patient Location): Patient's  home        Distant Site (Provider Location): Provider Remote Setting        Consent:  The patient/guardian has verbally consented to: the potential risks and benefits of telemedicine (video visit) versus in person care; bill my insurance or make self-payment for services provided; and responsibility for payment of non-covered services.         Mode of Communication:  Video Conference via [employer]GL        As the provider I attest to compliance with applicable laws and regulations related to telemedicine.         Patient Participation / Response:  Fully participated with the group by sharing personal reflections / insights and openly received / provided feedback with other participants.    Demonstrated understanding of topics discussed through group discussion and participation and Practiced skills in session    Treatment Plan:  Patient has a current master individualized treatment plan.  See Epic treatment plan for more information.    SIDRA PaulaSW

## 2020-12-10 NOTE — GROUP NOTE
Process Group Note    PATIENT'S NAME: Kate Jenkins  MRN:   [id number]  :   1980  ACCT. NUMBER: [id number]  DATE OF SERVICE: 12/10/20  START TIME:  9:00 AM  END TIME:  9:50 AM  FACILITATOR: Hannah Kelly LIC  TOPIC: MH Process Group    Diagnoses:  296.32 (F33.1) Major Depressive Disorder, Recurrent Episode, Moderate _ and With anxious distress  300.02 (F41.1) Generalized Anxiety Disorder.      [facility] Mental Health Day Treatment  TRACK: 5A    NUMBER OF PARTICIPANTS: 4    Telemedicine Visit: The patient's condition can be safely assessed and treated via synchronous audio and visual telemedicine encounter.      Reason for Telemedicine Visit: Services only offered telehealth    Originating Site (Patient Location): Patient's home    Distant Site (Provider Location): Provider Remote Setting    Consent:  The patient/guardian has verbally consented to: the potential risks and benefits of telemedicine (video visit) versus in person care; bill my insurance or make self-payment for services provided; and responsibility for payment of non-covered services.     Mode of Communication:  Video Conference via "[employer]    As the provider I attest to compliance with applicable laws and regulations related to telemedicine.           Data:    Session content: At the start of this group, patients were invited to check in by identifying themselves, describing their current emotional status, and identifying issues to address in this group.   Area(s) of treatment focus addressed in this session included Symptom Management, Personal Safety and Community Resources/Discharge Planning.  Kate reported improved mood.  She spent time with her daughter which she enjoyed.   She stted that she is having fatigue in the afternoon.   She is trying not to nap, but ended up napping for 30 minutes.  She reported some difficulty with feeling isolated.  Client denied suicidal ideation, intent and plan.      Therapeutic Interventions/Treatment Strategies:  Psychotherapist offered support, feedback and validation and reinforced use of skills. Treatment modalities used include Cognitive Behavioral Therapy. Interventions include Coping Skills: Assisted patient in understanding the purpose of planning / creating / participating / sharing in positive experiences.    Assessment:    Patient response:   Patient responded to session by focusing on goals and being attentive    Possible barriers to participation / learning include: and no barriers identified    Health Issues:   None reported       Substance Use Review:   Substance Use: No active concerns identified.    Mental Status/Behavioral Observations  Appearance:   Appropriate   Eye Contact:   Good   Psychomotor Behavior: Normal   Attitude:   Cooperative   Orientation:   All  Speech   Rate / Production: Normal    Volume:  Normal   Mood:    Anxious  Depressed   Affect:    Appropriate   Thought Content:   Clear  Thought Form:  Coherent  Logical     Insight:    Good     Plan:     Safety Plan: No current safety concerns identified.  Recommended that patient call 911 or go to the local ED should there be a change in any of these risk factors.     Barriers to treatment: None identified    Patient Contracts (see media tab):  None    Substance Use: Not addressed in session     Continue or Discharge: Patient will continue in Adult Day Treatment (ADT)  as planned. Patient is likely to benefit from learning and using skills as they work toward the goals identified in their treatment plan.      Hannah Kelly, [facility]  December 10, 2020

## 2020-12-10 NOTE — GROUP NOTE
Psychotherapy Group Note    PATIENT'S NAME: Kate Jenkins  MRN:   [id number]  :   1980  ACCT. NUMBER: [id number]  DATE OF SERVICE: 12/10/20  START TIME: 10:00 AM  END TIME: 10:50 AM  FACILITATOR: Hannah Kelly LICSW  TOPIC: MH EBP Group: Emotions Management  [facility] Mental Health Day Treatment  TRACK: 5A    NUMBER OF PARTICIPANTS: 4    Summary of Group / Topics Discussed:  Emotions Management: Mood Tracking: Patients discussed and reviewed different resources to track one s mood, with a goal of identifying patterns and correlations between different factors and mood state.  Patients discussed ways to increase awareness of one s mood and how it may be impacted by environmental factors, diet, activity level, medication, etc. The group shared their experiences and thought processes for feedback.      Patient Session Goals / Objectives:    Increase awareness of daily mood patterns/changes    Report out identified factors that impact their mood    Demonstrate understanding of how to use different resources to track mood, and effectively use these to help manage symptoms    Telemedicine Visit: The patient's condition can be safely assessed and treated via synchronous audio and visual telemedicine encounter.      Reason for Telemedicine Visit: Services only offered telehealth    Originating Site (Patient Location): Patient's home    Distant Site (Provider Location): Provider Remote Setting    Consent:  The patient/guardian has verbally consented to: the potential risks and benefits of telemedicine (video visit) versus in person care; bill my insurance or make self-payment for services provided; and responsibility for payment of non-covered services.     Mode of Communication:  Video Conference via [employer]    As the provider I attest to compliance with applicable laws and regulations related to telemedicine.       Patient Participation / Response:  Fully participated with the group by  sharing personal reflections / insights and openly received / provided feedback with other participants.    Practiced 2-3 new skills in session    Treatment Plan:  Patient has a current master individualized treatment plan.  See Epic treatment plan for more information.    Hannah Kelly, [facility] CenterSW

## 2020-12-14 ENCOUNTER — HOSPITAL ENCOUNTER (OUTPATIENT)
Dept: BEHAVIORAL HEALTH | Facility: CLINIC | Age: 40
End: 2020-12-14
Attending: PSYCHIATRY & NEUROLOGY
Payer: COMMERCIAL

## 2020-12-14 PROCEDURE — 90853 GROUP PSYCHOTHERAPY: CPT | Mod: GT | Performed by: SOCIAL WORKER

## 2020-12-14 PROCEDURE — G0177 OPPS/PHP; TRAIN & EDUC SERV: HCPCS | Mod: GT

## 2020-12-14 NOTE — GROUP NOTE
Psychoeducation Group Note    PATIENT'S NAME: Kate Jenkins  MRN:   [id number]  :   1980  ACCT. NUMBER: [id number]  DATE OF SERVICE: 20  START TIME: 11:00 AM  END TIME: 11:50 AM  FACILITATOR: Miriam Ricks RN  TOPIC:  RN Group: Mental Health Maintenance  Telemedicine Visit: The patient's condition can be safely assessed and treated via synchronous audio and visual telemedicine encounter.      Reason for Telemedicine Visit:  covid19    Originating Site (Patient Location): Patient's home    Distant Site (Provider Location): Provider Remote Setting    Consent:  The patient/guardian has verbally consented to: the potential risks and benefits of telemedicine (video visit) versus in person care; bill my insurance or make self-payment for services provided; and responsibility for payment of non-covered services.     Mode of Communication:  Video Conference via [employer]    As the provider I attest to compliance with applicable laws and regulations related to telemedicine.      [facility] Adult Mental Health Day Treatment  TRACK: 5A    NUMBER OF PARTICIPANTS: 4    Summary of Group / Topics Discussed:  Mental Health Maintenance:  Vulnerability: In this group, the concept of vulnerability was explored through the viewing, discussion, and self-reflection of the Karen Alfaro Talk Titled,  The Power of Vulnerability.      Patient Session Goals / Objectives:  ? Defined and described definition of vulnerability   ? Identified 2 or more ways of practicing authenticity         Patient Participation / Response:  Fully participated with the group by sharing personal reflections / insights and openly received / provided feedback with other participants.    Demonstrated understanding of topics discussed through group discussion and participation and Identified / Expressed personal readiness to practice skills    Treatment Plan:  Patient has a current master individualized treatment plan.  See Epic  treatment plan for more information.    Miriam Ricks RN

## 2020-12-14 NOTE — GROUP NOTE
Process Group Note    PATIENT'S NAME: Kate Jenkins  MRN:   [id number]  :   1980  ACCT. NUMBER: [id number]  DATE OF SERVICE: 20  START TIME:  9:00 AM  END TIME:  9:50 AM  FACILITATOR: Hannah Kelly [facility]  TOPIC: MH Process Group    Diagnoses:  296.32 (F33.1) Major Depressive Disorder, Recurrent Episode, Moderate _ and With anxious distress  300.02 (F41.1) Generalized Anxiety Disorder.      [facility] Mental Health Day Treatment  TRACK: 5A    NUMBER OF PARTICIPANTS: 6    Telemedicine Visit: The patient's condition can be safely assessed and treated via synchronous audio and visual telemedicine encounter.      Reason for Telemedicine Visit: Services only offered telehealth    Originating Site (Patient Location): Patient's home    Distant Site (Provider Location): Provider Remote Setting    Consent:  The patient/guardian has verbally consented to: the potential risks and benefits of telemedicine (video visit) versus in person care; bill my insurance or make self-payment for services provided; and responsibility for payment of non-covered services.     Mode of Communication:  Video Conference via "[employer]    As the provider I attest to compliance with applicable laws and regulations related to telemedicine.       Data:    Session content: At the start of this group, patients were invited to check in by identifying themselves, describing their current emotional status, and identifying issues to address in this group.   Area(s) of treatment focus addressed in this session included Symptom Management, Personal Safety and Community Resources/Discharge Planning.  Kate reported feeling lonely due to the  current shut down order for COVID 19 prevention.   She stated that her family has interpreted the order tightly and is not seeing anyone other than her daughter for whom she has shared custody.  She stated that she has activities and projects to do but has low motivation.  Client  "denied suicidal ideation, intent and plan. Peers validated client's loneliness and shared coping ideas.      Therapeutic Interventions/Treatment Strategies:  Psychotherapist offered support, feedback and validation and reinforced use of skills. Treatment modalities used include Cognitive Behavioral Therapy. Interventions include Coping Skills: Discussed how the use of intentional \"in the moment\" actions can help reduce distress.    Assessment:    Patient response:   Patient responded to session by accepting feedback, giving feedback, listening and focusing on goals    Possible barriers to participation / learning include: and no barriers identified    Health Issues:   None reported       Substance Use Review:   Substance Use: No active concerns identified.    Mental Status/Behavioral Observations  Appearance:   Appropriate   Eye Contact:   Good   Psychomotor Behavior: Normal   Attitude:   Cooperative   Orientation:   All  Speech   Rate / Production: Normal    Volume:  Normal   Mood:    Depressed  Sad   Affect:    Appropriate   Thought Content:   Clear  Thought Form:  Coherent  Logical     Insight:    Good     Plan:     Safety Plan: No current safety concerns identified.  Recommended that patient call 911 or go to the local ED should there be a change in any of these risk factors.     Barriers to treatment: None identified    Patient Contracts (see media tab):  None    Substance Use: Not addressed in session     Continue or Discharge: Patient will continue in Adult Day Treatment (ADT)  as planned. Patient is likely to benefit from learning and using skills as they work toward the goals identified in their treatment plan.      Hannah Kelly, [facility]  December 14, 2020  "

## 2020-12-14 NOTE — GROUP NOTE
Psychotherapy Group Note    PATIENT'S NAME: Kate Jenkins  MRN:   [id number]  :   1980  ACCT. NUMBER: [id number]  DATE OF SERVICE: 20  START TIME: 10:00 AM  END TIME: 10:50 AM  FACILITATOR: Hannah Kelly LICSW  TOPIC: MH EBP Group: Relationship Skills  [facility] Adult Mental Health Day Treatment  TRACK: 5A    NUMBER OF PARTICIPANTS: 6    Summary of Group / Topics Discussed:  Relationship Skills: Assertive Communication: Patients were provided with a general overview of assertive communication skills and how practicing assertive communication skills will assist patients in developing healthier and more effective relationships. Patients reviewed their current awareness on ability to practice assertive communication, ways to increase assertive communication, and identified/problem solved barriers to assertive communication.     Patient Session Goals / Objectives:    Identified and discussed patient individual challenges with communication    Presented and practiced effective communication skills in session    Assisted patients in implementing more effective communication skills in their relationships  Telemedicine Visit: The patient's condition can be safely assessed and treated via synchronous audio and visual telemedicine encounter.      Reason for Telemedicine Visit: Services only offered telehealth    Originating Site (Patient Location): Patient's home    Distant Site (Provider Location): Provider Remote Setting    Consent:  The patient/guardian has verbally consented to: the potential risks and benefits of telemedicine (video visit) versus in person care; bill my insurance or make self-payment for services provided; and responsibility for payment of non-covered services.     Mode of Communication:  Video Conference via [employer]    As the provider I attest to compliance with applicable laws and regulations related to telemedicine.       Patient Participation / Response:  Fully  participated with the group by sharing personal reflections / insights and openly received / provided feedback with other participants.    Identified / Expressed personal readiness to incorporate effective communication skills    Treatment Plan:  Patient has a current master individualized treatment plan.  See Epic treatment plan for more information.    Hannah Kelly, [facility] Care[facility]

## 2020-12-15 ENCOUNTER — HOSPITAL ENCOUNTER (OUTPATIENT)
Dept: BEHAVIORAL HEALTH | Facility: CLINIC | Age: 40
End: 2020-12-15
Attending: PSYCHIATRY & NEUROLOGY
Payer: COMMERCIAL

## 2020-12-15 PROCEDURE — 90853 GROUP PSYCHOTHERAPY: CPT | Mod: GT | Performed by: SOCIAL WORKER

## 2020-12-15 PROCEDURE — G0177 OPPS/PHP; TRAIN & EDUC SERV: HCPCS | Mod: GT

## 2020-12-15 NOTE — GROUP NOTE
Psychotherapy Group Note    PATIENT'S NAME: Kate Jenkins  MRN:   [id number]  :   1980  ACCT. NUMBER: [id number]  DATE OF SERVICE: 12/15/20  START TIME: 11:00 AM  END TIME: 11:50 AM  FACILITATOR: Hannah Kelly LICSW  TOPIC: MH EBP Group: Relationship Skills  [facility] Adult Mental Health Day Treatment  TRACK: 5A    NUMBER OF PARTICIPANTS: 3    Summary of Group / Topics Discussed:  Relationship Skills: Boundaries: Patients were provided with a general overview of interpersonal boundaries and how lack of boundaries relates to symptoms and functioning. The purpose is to help patients identify boundary issues and gain awareness and skills to work towards healthier interpersonal boundaries. Current awareness of healthy boundary characteristics and barriers to establishing healthy boundaries were discussed.    Patient Session Goals / Objectives:    Familiarized patients with the concept of interpersonal boundaries and their characteristics    Discussed and practiced strategies to promote healthier interpersonal boundaries    Identified boundary issues and identified plan to improve boundaries  Telemedicine Visit: The patient's condition can be safely assessed and treated via synchronous audio and visual telemedicine encounter.      Reason for Telemedicine Visit: Services only offered telehealth    Originating Site (Patient Location): Patient's home    Distant Site (Provider Location): Provider Remote Setting    Consent:  The patient/guardian has verbally consented to: the potential risks and benefits of telemedicine (video visit) versus in person care; bill my insurance or make self-payment for services provided; and responsibility for payment of non-covered services.     Mode of Communication:  Video Conference via [employer]    As the provider I attest to compliance with applicable laws and regulations related to telemedicine.       Patient Participation / Response:  Fully participated with  the group by sharing personal reflections / insights and openly received / provided feedback with other participants.    Demonstrated understanding of relationship skills and communication skills    Treatment Plan:  Patient has a current master individualized treatment plan.  See Epic treatment plan for more information.    Hannah Kelly, [facility]

## 2020-12-15 NOTE — PROGRESS NOTES
Patient Active Problem List   Diagnosis     Major depressive disorder, recurrent episode, moderate with anxious distress (H)       Current Outpatient Medications:      sertraline (ZOLOFT) 50 MG tablet, Take 50 mg by mouth daily, Disp: , Rfl:   Psychiatry staffing: case discussed  Diagnosis:    As above plus VIOLETA, working on geographic separation from family, lonliness

## 2020-12-15 NOTE — GROUP NOTE
Psychoeducation Group Note    PATIENT'S NAME: Kate Jenkins  MRN:   [id number]  :   1980  ACCT. NUMBER: [id number]  DATE OF SERVICE: 12/15/20  START TIME: 10:00 AM  END TIME: 10:50 AM  FACILITATOR: Kameron Portillo OTR/L  TOPIC: MH Life Skills Group: Communication and Social Skills Development  Telemedicine Visit: The patient's condition can be safely assessed and treated via synchronous audio and visual telemedicine encounter.      Reason for Telemedicine Visit: COVID-19    Originating Site (Patient Location): Patient's home    Distant Site (Provider Location): [facility]: [facility]    Consent:  The patient/guardian has verbally consented to: the potential risks and benefits of telemedicine (video visit) versus in person care; bill my insurance or make self-payment for services provided; and responsibility for payment of non-covered services.     Mode of Communication:  Video Conference via [employer]    As the provider I attest to compliance with applicable laws and regulations related to telemedicine.   [facility] Adult Mental Health Day Treatment  TRACK: 5A    NUMBER OF PARTICIPANTS: 3    Summary of Group / Topics Discussed:  Communication and Social Skills Development: Social Supports: Social Risk Taking: Patients explored and evaluated how effective they are in different aspects of social risk taking.  Patients gained awareness of different areas in which they need/want to take risks, possible benefits of taking this risk, and action steps to take.  Patients identified both personal strengths and opportunities for growth in social risk taking to improve overall communication and connection with other people.      Patient Session Goals / Objectives:    Identified strengths and opportunities for growth in social risk taking skills and how these impact their ability to communicate clearly with other people       Improved awareness of important aspects of social risk taking  skills and how this relates to mental health recovery        Established a plan for practice of these skills in their own environments    Practiced and reflected on how to generalize taught skills to their everyday life        Patient Participation / Response:  Fully participated with the group by sharing personal reflections / insights and openly received / provided feedback with other participants.    Demonstrated understanding of content through handout/group discussion  , Verbalized understanding of content and Patient would benefit from additional opportunities to practice the content to be able to generalize it to their everyday life with increased intentionality, consistency, and efficacy in support of their psychiatric recovery    Treatment Plan:  Patient has a current master individualized treatment plan.  See Epic treatment plan for more information.    Kameron Portillo, OTR/L

## 2020-12-15 NOTE — ADDENDUM NOTE
Encounter addended by: Keo Middleton MD on: 12/15/2020 8:53 AM   Actions taken: Clinical Note Signed

## 2020-12-15 NOTE — GROUP NOTE
Process Group Note    PATIENT'S NAME: Kate Jenkins  MRN:   [id number]  :   1980  ACCT. NUMBER: [id number]  DATE OF SERVICE: 12/15/20  START TIME:  9:00 AM  END TIME:  9:50 AM  FACILITATOR: Hannah Kelly LICSW  TOPIC: MH Process Group    Diagnoses:  296.32 (F33.1) Major Depressive Disorder, Recurrent Episode, Moderate _ and With anxious distress  300.02 (F41.1) Generalized Anxiety Disorder.      [facility] Mental Health Day Treatment  TRACK: 5A    NUMBER OF PARTICIPANTS: 4    Telemedicine Visit: The patient's condition can be safely assessed and treated via synchronous audio and visual telemedicine encounter.      Reason for Telemedicine Visit: Services only offered telehealth    Originating Site (Patient Location): Patient's home    Distant Site (Provider Location): Provider Remote Setting    Consent:  The patient/guardian has verbally consented to: the potential risks and benefits of telemedicine (video visit) versus in person care; bill my insurance or make self-payment for services provided; and responsibility for payment of non-covered services.     Mode of Communication:  Video Conference via [employer]    As the provider I attest to compliance with applicable laws and regulations related to telemedicine.           Data:    Session content: At the start of this group, patients were invited to check in by identifying themselves, describing their current emotional status, and identifying issues to address in this group.   Area(s) of treatment focus addressed in this session included Symptom Management, Personal Safety and Community Resources/Discharge Planning.  Kate reported some reduction in feelings lf loneliness.   She stated that it helped yesterday to talk about it with others and find out that others were also lonely with the COVID restrictions.  She stated that she is trying to schedule her time by herself.  She stated that she is tired today as she slept one the couch  due to her 's res=tlessness while he slept.  She stated that she is trying to go on a walk daily.   Mood seemed somewhat less depressed and less anxious.   Client denied suicidal ideation, intent and plan.     Therapeutic Interventions/Treatment Strategies:  Psychotherapist offered support, feedback and validation and reinforced use of skills. Treatment modalities used include Cognitive Behavioral Therapy. Interventions include Coping Skills: Assisted patient in understanding the purpose of planning / creating / participating / sharing in positive experiences.    Assessment:    Patient response:   Patient responded to session by focusing on goals and being attentive    Possible barriers to participation / learning include: and no barriers identified    Health Issues:   None reported       Substance Use Review:   Substance Use: No active concerns identified.    Mental Status/Behavioral Observations  Appearance:   Appropriate   Eye Contact:   Good   Psychomotor Behavior: Normal   Attitude:   Cooperative  Friendly Pleasant  Orientation:   All  Speech   Rate / Production: Normal    Volume:  Normal   Mood:    Anxious  Depressed   Affect:    Appropriate   Thought Content:   Clear  Thought Form:  Coherent  Logical     Insight:    Good     Plan:     Safety Plan: No current safety concerns identified.  Recommended that patient call 911 or go to the local ED should there be a change in any of these risk factors.     Barriers to treatment: None identified    Patient Contracts (see media tab):  None    Substance Use: Not addressed in session     Continue or Discharge: Patient will continue in Adult Day Treatment (ADT)  as planned. Patient is likely to benefit from learning and using skills as they work toward the goals identified in their treatment plan.      Hannah Kelly, [facility]  December 15, 2020

## 2020-12-17 ENCOUNTER — HOSPITAL ENCOUNTER (OUTPATIENT)
Dept: BEHAVIORAL HEALTH | Facility: CLINIC | Age: 40
End: 2020-12-17
Attending: PSYCHIATRY & NEUROLOGY
Payer: COMMERCIAL

## 2020-12-17 PROCEDURE — 90853 GROUP PSYCHOTHERAPY: CPT | Mod: GT | Performed by: PSYCHOLOGIST

## 2020-12-17 PROCEDURE — 90853 GROUP PSYCHOTHERAPY: CPT | Mod: GT | Performed by: SOCIAL WORKER

## 2020-12-17 PROCEDURE — 90853 GROUP PSYCHOTHERAPY: CPT | Mod: GT

## 2020-12-17 NOTE — GROUP NOTE
Psychotherapy Group Note    PATIENT'S NAME: Kate Jenkins  MRN:   [id number]  :   1980  ACCT. NUMBER: [id number]  DATE OF SERVICE: 20  START TIME: 11:00 AM  END TIME: 11:50 AM  FACILITATOR: Hannah Dodge LICSW  TOPIC:  EBP Group: Enhanced Mindfulness  [facility] Adult Mental Health Day Treatment  TRACK: 5A    NUMBER OF PARTICIPANTS: 4    Summary of Group / Topics Discussed:  Enhanced Mindfulness: Body and Mind Integration: Patients received an overview and education regarding the importance of including the body in the management of emotional health and self-care and as a direct route to mindfulness practice.  Patients discussed various ways in which the body can serve as an informant to their physical and emotional experiences/need. Patients discussed the body as a direct link to the present moment and to mindfulness practice.  Patients discussed current relationship with body, self-awareness, mindfulness practice and barriers to connection with body.  Patients were guided through breath work and movement to facilitate greater self-awareness, grounding, self-expression, and connection to other.  Patients discussed how the experiential could be applied to better manage mental health and develop noyola connection to self.    Patient Session Goals / Objectives:    Identify how movement awareness could be used for grounding, stress management, self-expression, connection to other and self-regulation    Improved awareness of breath and movement preferences    Identify how movement and the body is used in mindfulness practice    Reflect on use of these practices in everyday life.    Identify barriers to attending to body    Telemedicine Visit: The patient's condition can be safely assessed and treated via synchronous audio and visual telemedicine encounter.          Reason for Telemedicine Visit: Services only offered telehealth and covid19        Originating Site (Patient Location): Patient's  home        Distant Site (Provider Location): Provider Remote Setting        Consent:  The patient/guardian has verbally consented to: the potential risks and benefits of telemedicine (video visit) versus in person care; bill my insurance or make self-payment for services provided; and responsibility for payment of non-covered services.         Mode of Communication:  Video Conference via [employer]        As the provider I attest to compliance with applicable laws and regulations related to telemedicine.         Patient Participation / Response:  Fully participated with the group by sharing personal reflections / insights and openly received / provided feedback with other participants.    Demonstrated understanding of topics discussed through group discussion and participation, Identified / Expressed readiness to act on skill suggestions discussed in topic and Practiced skills in session    Treatment Plan:  Patient has a current master individualized treatment plan.  See Epic treatment plan for more information.    SIDRA PaulaSW

## 2020-12-17 NOTE — GROUP NOTE
Process Group Note  Telemedicine Visit: The patient's condition can be safely assessed and treated via synchronous audio and visual telemedicine encounter.    Reason for Telemedicine Visit: Patient has requested telehealth visit  Originating Site (Patient Location): Patient's home  Distant Site (Provider Location): [facility] Outpatient Setting: Adult Day Tx  Consent:  The patient/guardian has verbally consented to: the potential risks and benefits of telemedicine (video visit) versus in person care; bill my insurance or make self-payment for services provided; and responsibility for payment of non-covered services.   Mode of Communication:  Video Conference via [employer]  As the provider I attest to compliance with applicable laws and regulations related to telemedicine.    PATIENT'S NAME: Kate Jenkins  MRN:   [id number]  :   1980  ACCT. NUMBER: [id number]  DATE OF SERVICE: 20  START TIME:  9:00 AM  END TIME:  9:50 AM  FACILITATOR: Laurie Rosales PsyD  TOPIC:  Process Group    Diagnoses:  Major depressive disorder, recurrent episode, moderate with anxious distress (H)    [facility] Adult Mental Health Day Treatment  TRACK: 5A    NUMBER OF PARTICIPANTS: 4          Data:    Session content: At the start of this group, patients were invited to check in by identifying themselves, describing their current emotional status, and identifying issues to address in this group.   Area(s) of treatment focus addressed in this session included Symptom Management, Personal Safety and Community Resources/Discharge Planning.  Client reported being safe today.  Reported mood is anxious.    Goal for today is to attend therapy.   The client talked to the group about how her daughter will come and celebrate the holidays with her and that her daughter likes to bake cookies and decorate. She reported that she has low energy and doesn't feel like decorating during the frantic times. She reported that she  will go to be with family for the holiday.       Therapeutic Interventions/Treatment Strategies:  Psychotherapist reinforced use of skills. Treatment modalities used include Cognitive Behavioral Therapy and Dialectical Behavioral Therapy. Interventions include Cognitive Restructuring:  Facilitated recognition of the connection between negative thoughts and negative core beliefs, Coping Skills: Reviewed patients current calming practices and discussed a more formal way of practicing and accessing skills and Relapse Prevention: Facilitated understanding of effective coping skills in response to triggers for substance use.    Assessment:    Patient response:   Patient responded to session by giving feedback, listening and being attentive    Possible barriers to participation / learning include: severity of symptoms    Health Issues:   None reported       Substance Use Review:   Substance Use: No active concerns identified.    Mental Status/Behavioral Observations  Appearance:   Appropriate   Eye Contact:   Good   Psychomotor Behavior: Normal   Attitude:   Cooperative   Orientation:   All  Speech   Rate / Production: Normal    Volume:  Normal   Mood:    depressed  Affect:    Constricted   Thought Content:   Clear  Thought Form:  Coherent  Logical     Insight:    Good     Plan:     Safety Plan: Recommended that patient call 911 or go to the local ED should there be a change in any of these risk factors.     Barriers to treatment: None identified    Patient Contracts (see media tab):  None    Substance Use: Provided encouragement towards sobriety     Continue or Discharge: Patient will continue in Adult Day Treatment (ADT)  as planned. Patient is likely to benefit from learning and using skills as they work toward the goals identified in their treatment plan.      Laurie Rosales PsyD  December 17, 2020  Celestino Butler, JOYCE,  Licensed Clinical Psychologist

## 2020-12-17 NOTE — GROUP NOTE
Telemedicine Visit: The patient's condition can be safely assessed and treated via synchronous audio and visual telemedicine encounter.      Reason for Telemedicine Visit: Services only offered telehealth    Originating Site (Patient Location): Patient's home    Distant Site (Provider Location): Provider Remote Setting    Consent:  The patient/guardian has verbally consented to: the potential risks and benefits of telemedicine (video visit) versus in person care; bill my insurance or make self-payment for services provided; and responsibility for payment of non-covered services.     Mode of Communication:  Video Conference via [employer]    As the provider I attest to compliance with applicable laws and regulations related to telemedicine.  Psychotherapy Group Note    PATIENT'S NAME: Kate Jenkins  MRN:   [id number]  :   1980  ACCT. NUMBER: [id number]  DATE OF SERVICE: 20  START TIME: 10:00 AM  END TIME: 10:50 AM  FACILITATOR: Julieta Shelton LMFT  TOPIC:  EBP Group: Relationship Skills  [facility] Adult Mental Health Day Treatment  TRACK: 5A    NUMBER OF PARTICIPANTS: 4    Summary of Group / Topics Discussed:  Relationship Skills: Conflict Resolution: Topic of conflict resolution in interpersonal communication was discussed. Patients received an overview of how communication skills during times of conflict impact symptoms and functioning. Patients discussed their current communication challenges and how this impacts their functioning. Patients also verbalized understanding of skills learned and practiced in session.     Patient Session Goals / Objectives:    Identified and discussed patient individual challenges with communication    Presented and practiced effective communication skills in session    Assisted patients in implementing more effective communication skills in their relationships      Patient Participation / Response:  Fully participated with the group by sharing personal  reflections / insights and openly received / provided feedback with other participants.    Demonstrated understanding of topics discussed through group discussion and participation, Demonstrated understanding of relationship skills and communication skills and Practiced skills in session    Treatment Plan:  Patient has a current master individualized treatment plan.  See Epic treatment plan for more information.    JOYCE Cordova

## 2020-12-21 ENCOUNTER — HOSPITAL ENCOUNTER (OUTPATIENT)
Dept: BEHAVIORAL HEALTH | Facility: CLINIC | Age: 40
End: 2020-12-21
Attending: PSYCHIATRY & NEUROLOGY
Payer: COMMERCIAL

## 2020-12-21 PROCEDURE — 90853 GROUP PSYCHOTHERAPY: CPT | Mod: GT | Performed by: SOCIAL WORKER

## 2020-12-21 PROCEDURE — G0177 OPPS/PHP; TRAIN & EDUC SERV: HCPCS | Mod: GT

## 2020-12-21 NOTE — GROUP NOTE
Process Group Note    PATIENT'S NAME: Kate Jenkins  MRN:   [id number]  :   1980  ACCT. NUMBER: [id number]  DATE OF SERVICE: 20  START TIME:  9:00 AM  END TIME:  9:50 AM  FACILITATOR: Hannah Kelly [facility]  TOPIC: MH Process Group    Diagnoses:  296.32 (F33.1) Major Depressive Disorder, Recurrent Episode, Moderate _ and With anxious distress  300.02 (F41.1) Generalized Anxiety Disorder.      [facility] Mental Health Day Treatment  TRACK: 5A    NUMBER OF PARTICIPANTS: 6    Telemedicine Visit: The patient's condition can be safely assessed and treated via synchronous audio and visual telemedicine encounter.      Reason for Telemedicine Visit: Services only offered telehealth    Originating Site (Patient Location): Patient's home    Distant Site (Provider Location): Provider Remote Setting    Consent:  The patient/guardian has verbally consented to: the potential risks and benefits of telemedicine (video visit) versus in person care; bill my insurance or make self-payment for services provided; and responsibility for payment of non-covered services.     Mode of Communication:  Video Conference via [employer]    As the provider I attest to compliance with applicable laws and regulations related to telemedicine.           Data:    Session content: At the start of this group, patients were invited to check in by identifying themselves, describing their current emotional status, and identifying issues to address in this group.   Area(s) of treatment focus addressed in this session included Symptom Management, Personal Safety and Community Resources/Discharge Planning.  Kate reported having a good visit with her daughter this weekend.   Client has shared custody and this was her weekend for parenting time.  She stated that her mood and symptoms were improved.   She stated that she was less depressed and less anxious.  Client denied suicidal ideation, intent and plan.     Therapeutic  Interventions/Treatment Strategies:  Psychotherapist offered support, feedback and validation and reinforced use of skills. Treatment modalities used include Cognitive Behavioral Therapy. Interventions include Relationship Skills: Discussed strategies to promote healthier understanding of interpersonal relationships.    Assessment:    Patient response:   Patient responded to session by listening, focusing on goals and being attentive    Possible barriers to participation / learning include: and no barriers identified    Health Issues:   None reported       Substance Use Review:   Substance Use: No active concerns identified.    Mental Status/Behavioral Observations  Appearance:   Appropriate   Eye Contact:   Good   Psychomotor Behavior: Normal   Attitude:   Cooperative   Orientation:   All  Speech   Rate / Production: Normal    Volume:  Normal   Mood:    Normal  Affect:    Appropriate   Thought Content:   Clear  Thought Form:  Coherent  Logical     Insight:    Good     Plan:     Safety Plan: No current safety concerns identified.  Recommended that patient call 911 or go to the local ED should there be a change in any of these risk factors.     Barriers to treatment: None identified    Patient Contracts (see media tab):  None    Substance Use: Not addressed in session     Continue or Discharge: Patient will continue in Adult Day Treatment (ADT)  as planned. Patient is likely to benefit from learning and using skills as they work toward the goals identified in their treatment plan.      Hannah Kelly, [facility]  December 21, 2020

## 2020-12-21 NOTE — GROUP NOTE
Psychoeducation Group Note    PATIENT'S NAME: Kate Jenkins  MRN:   [id number]  :   1980  ACCT. NUMBER: [id number]  DATE OF SERVICE: 20  START TIME: 11:00 AM  END TIME: 11:50 AM  FACILITATOR: Miriam Ricks RN  TOPIC:  RN Group: Mental Health Maintenance  Telemedicine Visit: The patient's condition can be safely assessed and treated via synchronous audio and visual telemedicine encounter.      Reason for Telemedicine Visit:  covid19    Originating Site (Patient Location): Patient's home    Distant Site (Provider Location): Provider Remote Setting    Consent:  The patient/guardian has verbally consented to: the potential risks and benefits of telemedicine (video visit) versus in person care; bill my insurance or make self-payment for services provided; and responsibility for payment of non-covered services.     Mode of Communication:  Video Conference via [employer]    As the provider I attest to compliance with applicable laws and regulations related to telemedicine.      [facility] Mental Health Day Treatment  TRACK: 5A    NUMBER OF PARTICIPANTS: 5    Summary of Group / Topics Discussed:  Mental Health Maintenance:  Letting Go of Stress: Patients viewed 20 minute video which demonstrated simple proven techniques too quickly and effectively release stress.     Patient Session Goals / Objectives:  ? Patients discovered quick, easy and effective stress reduction techniques  ? Patients practiced techniques that were demonstrated on the video  ? Patients identified one technique they will use to cope with symptoms          Patient Participation / Response:  Fully participated with the group by sharing personal reflections / insights and openly received / provided feedback with other participants.    Demonstrated understanding of topics discussed through group discussion and participation and Identified / Expressed personal readiness to practice skills    Treatment Plan:  Patient has a  current master individualized treatment plan.  See Epic treatment plan for more information.    Miriam Ricks RN

## 2020-12-21 NOTE — GROUP NOTE
Psychotherapy Group Note    PATIENT'S NAME: Kate Jenkins  MRN:   [id number]  :   1980  ACCT. NUMBER: [id number]  DATE OF SERVICE: 20  START TIME: 10:00 AM  END TIME: 10:50 AM  FACILITATOR: Hannah Kelly LICSW  TOPIC: MH EBP Group: Self-Awareness  [facility] Mental Health Day Treatment  TRACK: 5A    NUMBER OF PARTICIPANTS: 6    Summary of Group / Topics Discussed:  Self-Awareness: Values: Patients identified personal values by examining development of their current values and how their values influence their daily functioning and life choices. Patients explored the impact of their values on their thoughts, feelings, and actions. Patients discussed definition of personal values and how they develop and change over time. The goal is to help patients reconcile value conflicts and achieve balance and flexibility to improve mood and daily functioning.     Patient Session Goals / Objectives:    Examined development of values and impact of values on functioning    Identified and prioritized important values related to current well-being     Identified strategies to change or enhance values to positively impact symptoms    Assisted patients to find ways to adapt functioning to better fit their values  Telemedicine Visit: The patient's condition can be safely assessed and treated via synchronous audio and visual telemedicine encounter.      Reason for Telemedicine Visit: Patient has requested telehealth visit    Originating Site (Patient Location): Patient's home    Distant Site (Provider Location): Provider Remote Setting    Consent:  The patient/guardian has verbally consented to: the potential risks and benefits of telemedicine (video visit) versus in person care; bill my insurance or make self-payment for services provided; and responsibility for payment of non-covered services.     Mode of Communication:  Video Conference via [employer]    As the provider I attest to compliance  with applicable laws and regulations related to telemedicine.       Patient Participation / Response:  Fully participated with the group by sharing personal reflections / insights and openly received / provided feedback with other participants.    Demonstrated understanding of values, strengths, and challenges to learn about themselves    Treatment Plan:  Patient has a current master individualized treatment plan.  See Epic treatment plan for more information.    Hannah Kelly, LICSW

## 2020-12-22 ENCOUNTER — HOSPITAL ENCOUNTER (OUTPATIENT)
Dept: BEHAVIORAL HEALTH | Facility: CLINIC | Age: 40
End: 2020-12-22
Attending: PSYCHIATRY & NEUROLOGY
Payer: COMMERCIAL

## 2020-12-22 PROCEDURE — 90853 GROUP PSYCHOTHERAPY: CPT | Mod: GT | Performed by: SOCIAL WORKER

## 2020-12-22 PROCEDURE — G0177 OPPS/PHP; TRAIN & EDUC SERV: HCPCS | Mod: GT

## 2020-12-22 NOTE — GROUP NOTE
Psychotherapy Group Note    PATIENT'S NAME: Kate Jenkins  MRN:   [id number]  :   1980  ACCT. NUMBER: [id number]  DATE OF SERVICE: 20  START TIME: 10:00 AM  END TIME: 10:50 AM  FACILITATOR: Hannah Kelly LICSW  TOPIC: MH EBP Group: Self-Awareness  [facility] Mental Health Day Treatment  TRACK: 5A    NUMBER OF PARTICIPANTS: 5    Summary of Group / Topics Discussed:  Self-Awareness: Personal Strengths: Topic focused on assisting patients in identifying personal strengths and how they relate to the management of mental health symptoms. Patients discussed the benefits of acknowledging their personal strengths and their impact on mood improvement, mindfulness, and perspective. Patients worked to increase time spent on recognition and appreciation of what is positive and working in their lives. The goal is to reduce rumination and negative thinking resulting in increased mindfulness and resilience. Patients will work to put skills into practice and problem-solve barriers.     Patient Session Goals / Objectives:    Identified personal strengths    Identified barriers to recognition of personal strengths    Verbalized understanding of strategies to increase use of their strengths in management of daily symptoms    Telemedicine Visit: The patient's condition can be safely assessed and treated via synchronous audio and visual telemedicine encounter.      Reason for Telemedicine Visit: Services only offered telehealth    Originating Site (Patient Location): Patient's home    Distant Site (Provider Location): Provider Remote Setting    Consent:  The patient/guardian has verbally consented to: the potential risks and benefits of telemedicine (video visit) versus in person care; bill my insurance or make self-payment for services provided; and responsibility for payment of non-covered services.     Mode of Communication:  Video Conference via [employer]    As the provider I attest to  compliance with applicable laws and regulations related to telemedicine.       Patient Participation / Response:  Fully participated with the group by sharing personal reflections / insights and openly received / provided feedback with other participants.    Identified / Expressed readiness to act intentionally, increase self-compassion, promote personal growth    Treatment Plan:  Patient has a current master individualized treatment plan.  See Epic treatment plan for more information.    Hannah Kelly, [facility]

## 2020-12-22 NOTE — GROUP NOTE
Process Group Note    PATIENT'S NAME: Kate Jenkins  MRN:   [id number]  :   1980  ACCT. NUMBER: [id number]  DATE OF SERVICE: 20  START TIME:  9:00 AM  END TIME:  9:50 AM  FACILITATOR: Hannah Kelly LICSW  TOPIC: MH Process Group    Diagnoses:  296.32 (F33.1) Major Depressive Disorder, Recurrent Episode, Moderate _ and With anxious distress  300.02 (F41.1) Generalized Anxiety Disorder.      [facility] Mental Health Day Treatment  TRACK: 5A    NUMBER OF PARTICIPANTS: 7    Telemedicine Visit: The patient's condition can be safely assessed and treated via synchronous audio and visual telemedicine encounter.      Reason for Telemedicine Visit: Services only offered telehealth    Originating Site (Patient Location): Patient's home    Distant Site (Provider Location): Provider Remote Setting    Consent:  The patient/guardian has verbally consented to: the potential risks and benefits of telemedicine (video visit) versus in person care; bill my insurance or make self-payment for services provided; and responsibility for payment of non-covered services.     Mode of Communication:  Video Conference via [employer]    As the provider I attest to compliance with applicable laws and regulations related to telemedicine.           Data:    Session content: At the start of this group, patients were invited to check in by identifying themselves, describing their current emotional status, and identifying issues to address in this group.   Area(s) of treatment focus addressed in this session included Symptom Management, Personal Safety and Community Resources/Discharge Planning.  Kate reported stable mood with low levels of depressive and anxiety symptoms.  She reported feeling ready to complete the program.  Sleep, appetite, energy, and concentration were stable.  She denied difficulty with negative self-talk.  Client denied suicidal ideation, intent and plan. She has discharge supports in  place.  Client shared discharge plan with the group.  Client was receptive to feedback from peers on their strengths and progress in the program.    Therapeutic Interventions/Treatment Strategies:  Psychotherapist offered support, feedback and validation and reinforced use of skills. Treatment modalities used include Cognitive Behavioral Therapy. Interventions include Behavioral Activation: Explored how behaviors effect mood and interact with thoughts and feelings.    Assessment:    Patient response:   Patient responded to session by listening, focusing on goals and being attentive    Possible barriers to participation / learning include: and no barriers identified    Health Issues:   None reported       Substance Use Review:   Substance Use: No active concerns identified.    Mental Status/Behavioral Observations  Appearance:   Appropriate   Eye Contact:   Good   Psychomotor Behavior: Normal   Attitude:   Cooperative   Orientation:   All  Speech   Rate / Production: Normal    Volume:  Normal   Mood:    Normal  Affect:    Appropriate   Thought Content:   Clear  Thought Form:  Coherent  Logical     Insight:    Good     Plan:     Safety Plan: No current safety concerns identified.  Recommended that patient call 911 or go to the local ED should there be a change in any of these risk factors.     Barriers to treatment: None identified    Patient Contracts (see media tab):  None    Substance Use: Not addressed in session     Continue or Discharge: Patient will continue in Adult Day Treatment (ADT)  as planned. Patient is likely to benefit from learning and using skills as they work toward the goals identified in their treatment plan.      Hannah Kelly, [facility]  December 22, 2020

## 2020-12-22 NOTE — GROUP NOTE
Psychoeducation Group Note    PATIENT'S NAME: Kate Jenkins  MRN:   [id number]  :   1980  ACCT. NUMBER: [id number]  DATE OF SERVICE: 20  START TIME: 10:00 AM  END TIME: 10:50 AM  FACILITATOR: Kameron Portillo OTR/L  TOPIC: MH Life Skills Group: Communication and Social Skills Development  Telemedicine Visit: The patient's condition can be safely assessed and treated via synchronous audio and visual telemedicine encounter.      Reason for Telemedicine Visit: COVID-19    Originating Site (Patient Location): Patient's home    Distant Site (Provider Location): [facility]: [facility]    Consent:  The patient/guardian has verbally consented to: the potential risks and benefits of telemedicine (video visit) versus in person care; bill my insurance or make self-payment for services provided; and responsibility for payment of non-covered services.     Mode of Communication:  Video Conference via [employer]    As the provider I attest to compliance with applicable laws and regulations related to telemedicine.   [facility] Adult Mental Health Day Treatment  TRACK: 5A    NUMBER OF PARTICIPANTS: 6    Summary of Group / Topics Discussed:  Communication and Social Skills Development: Social Supports: Conflict Management Styles Scale: Patients completed a self assessment of their conflict management skills to identify both strengths and opportunities for growth.  Patients gained awareness of effective conflict management skills so they can connect with others more successfully.       Patient Session Goals / Objectives:    Identified strengths and opportunities for growth in conflict management skills and how these  impact their ability to communicate clearly with other people       Improved awareness of important aspects of conflict management skills and how this relates to mental health recovery        Established a plan for practice of these skills in their own  environments    Practiced and reflected on how to generalize taught skills to their everyday life        Patient Participation / Response:  Fully participated with the group by sharing personal reflections / insights and openly received / provided feedback with other participants.    Demonstrated understanding of content through handout/group discussion , Verbalized understanding of content and Patient would benefit from additional opportunities to practice the content to be able to generalize it to their everyday life with increased intentionality, consistency, and efficacy in support of their psychiatric recovery    Treatment Plan:  Patient has a current master individualized treatment plan.  See Epic treatment plan for more information.    Kameron Portillo, OTR/L

## 2021-01-04 ENCOUNTER — HEALTH MAINTENANCE LETTER (OUTPATIENT)
Age: 41
End: 2021-01-04

## 2021-10-10 ENCOUNTER — HEALTH MAINTENANCE LETTER (OUTPATIENT)
Age: 41
End: 2021-10-10

## 2022-01-30 ENCOUNTER — HEALTH MAINTENANCE LETTER (OUTPATIENT)
Age: 42
End: 2022-01-30

## 2022-09-18 ENCOUNTER — HEALTH MAINTENANCE LETTER (OUTPATIENT)
Age: 42
End: 2022-09-18

## 2023-05-08 ENCOUNTER — HEALTH MAINTENANCE LETTER (OUTPATIENT)
Age: 43
End: 2023-05-08

## 2024-02-25 ENCOUNTER — HEALTH MAINTENANCE LETTER (OUTPATIENT)
Age: 44
End: 2024-02-25